# Patient Record
Sex: FEMALE | Race: WHITE | NOT HISPANIC OR LATINO | Employment: OTHER | URBAN - METROPOLITAN AREA
[De-identification: names, ages, dates, MRNs, and addresses within clinical notes are randomized per-mention and may not be internally consistent; named-entity substitution may affect disease eponyms.]

---

## 2019-09-06 ENCOUNTER — DOCUMENTATION ONLY (OUTPATIENT)
Dept: OTHER | Facility: CLINIC | Age: 74
End: 2019-09-06

## 2021-05-29 ENCOUNTER — RECORDS - HEALTHEAST (OUTPATIENT)
Dept: ADMINISTRATIVE | Facility: CLINIC | Age: 76
End: 2021-05-29

## 2024-04-18 DIAGNOSIS — C34.90 LUNG CANCER (H): Primary | ICD-10-CM

## 2024-04-29 ENCOUNTER — HOSPITAL ENCOUNTER (OUTPATIENT)
Dept: CT IMAGING | Facility: CLINIC | Age: 79
Discharge: HOME | End: 2024-04-29
Attending: RADIOLOGY | Admitting: RADIOLOGY
Payer: COMMERCIAL

## 2024-04-29 DIAGNOSIS — C34.90 LUNG CANCER (H): ICD-10-CM

## 2024-04-29 LAB
CREAT BLD-MCNC: 1.4 MG/DL (ref 0.5–1)
EGFRCR SERPLBLD CKD-EPI 2021: 39 ML/MIN/1.73M2

## 2024-04-29 PROCEDURE — 82565 ASSAY OF CREATININE: CPT

## 2024-04-29 PROCEDURE — 250N000009 HC RX 250: Performed by: RADIOLOGY

## 2024-04-29 PROCEDURE — 250N000011 HC RX IP 250 OP 636: Performed by: RADIOLOGY

## 2024-04-29 PROCEDURE — 71260 CT THORAX DX C+: CPT

## 2024-04-29 RX ORDER — IOPAMIDOL 755 MG/ML
500 INJECTION, SOLUTION INTRAVASCULAR ONCE
Status: COMPLETED | OUTPATIENT
Start: 2024-04-29 | End: 2024-04-29

## 2024-04-29 RX ADMIN — IOPAMIDOL 83 ML: 755 INJECTION, SOLUTION INTRAVENOUS at 09:58

## 2024-04-29 RX ADMIN — SODIUM CHLORIDE 68 ML: 9 INJECTION, SOLUTION INTRAVENOUS at 09:58

## 2024-05-25 ENCOUNTER — HEALTH MAINTENANCE LETTER (OUTPATIENT)
Age: 79
End: 2024-05-25

## 2024-07-09 ENCOUNTER — HOSPITAL ENCOUNTER (INPATIENT)
Facility: CLINIC | Age: 79
LOS: 8 days | Discharge: HOME HEALTH | DRG: 205 | End: 2024-07-17
Attending: EMERGENCY MEDICINE | Admitting: INTERNAL MEDICINE
Payer: COMMERCIAL

## 2024-07-09 ENCOUNTER — APPOINTMENT (OUTPATIENT)
Dept: CT IMAGING | Facility: CLINIC | Age: 79
DRG: 205 | End: 2024-07-09
Payer: COMMERCIAL

## 2024-07-09 DIAGNOSIS — J15.9 COMMUNITY ACQUIRED BACTERIAL PNEUMONIA: ICD-10-CM

## 2024-07-09 DIAGNOSIS — R09.02 HYPOXIA: ICD-10-CM

## 2024-07-09 DIAGNOSIS — I50.9 ACUTE CONGESTIVE HEART FAILURE, UNSPECIFIED HEART FAILURE TYPE (H): ICD-10-CM

## 2024-07-09 LAB
ALBUMIN SERPL BCG-MCNC: 2.8 G/DL (ref 3.5–5.2)
ALP SERPL-CCNC: 78 U/L (ref 40–150)
ALT SERPL W P-5'-P-CCNC: 52 U/L (ref 0–50)
ANION GAP SERPL CALCULATED.3IONS-SCNC: 13 MMOL/L (ref 7–15)
AST SERPL W P-5'-P-CCNC: 101 U/L (ref 0–45)
BASOPHILS # BLD AUTO: 0 10E3/UL (ref 0–0.2)
BASOPHILS NFR BLD AUTO: 0 %
BILIRUB SERPL-MCNC: 0.6 MG/DL
BUN SERPL-MCNC: 22.3 MG/DL (ref 8–23)
CALCIUM SERPL-MCNC: 8.7 MG/DL (ref 8.8–10.2)
CHLORIDE SERPL-SCNC: 90 MMOL/L (ref 98–107)
CREAT SERPL-MCNC: 1.37 MG/DL (ref 0.51–0.95)
CREAT SERPL-MCNC: 1.41 MG/DL (ref 0.51–0.95)
D DIMER PPP FEU-MCNC: 1.6 UG/ML FEU (ref 0–0.5)
DEPRECATED HCO3 PLAS-SCNC: 25 MMOL/L (ref 22–29)
EGFRCR SERPLBLD CKD-EPI 2021: 38 ML/MIN/1.73M2
EGFRCR SERPLBLD CKD-EPI 2021: 40 ML/MIN/1.73M2
EOSINOPHIL # BLD AUTO: 0 10E3/UL (ref 0–0.7)
EOSINOPHIL NFR BLD AUTO: 0 %
ERYTHROCYTE [DISTWIDTH] IN BLOOD BY AUTOMATED COUNT: 18 % (ref 10–15)
GLUCOSE SERPL-MCNC: 104 MG/DL (ref 70–99)
HCT VFR BLD AUTO: 34.7 % (ref 35–47)
HGB BLD-MCNC: 11.9 G/DL (ref 11.7–15.7)
HOLD SPECIMEN: NORMAL
IMM GRANULOCYTES # BLD: 0.2 10E3/UL
IMM GRANULOCYTES NFR BLD: 2 %
LYMPHOCYTES # BLD AUTO: 0.2 10E3/UL (ref 0.8–5.3)
LYMPHOCYTES NFR BLD AUTO: 2 %
MCH RBC QN AUTO: 30 PG (ref 26.5–33)
MCHC RBC AUTO-ENTMCNC: 34.3 G/DL (ref 31.5–36.5)
MCV RBC AUTO: 87 FL (ref 78–100)
MONOCYTES # BLD AUTO: 1.5 10E3/UL (ref 0–1.3)
MONOCYTES NFR BLD AUTO: 15 %
NEUTROPHILS # BLD AUTO: 7.8 10E3/UL (ref 1.6–8.3)
NEUTROPHILS NFR BLD AUTO: 80 %
NRBC # BLD AUTO: 0 10E3/UL
NRBC BLD AUTO-RTO: 0 /100
NT-PROBNP SERPL-MCNC: 2184 PG/ML (ref 0–1800)
PLATELET # BLD AUTO: 408 10E3/UL (ref 150–450)
POTASSIUM SERPL-SCNC: 3.2 MMOL/L (ref 3.4–5.3)
PROT SERPL-MCNC: 6.3 G/DL (ref 6.4–8.3)
RBC # BLD AUTO: 3.97 10E6/UL (ref 3.8–5.2)
SODIUM SERPL-SCNC: 128 MMOL/L (ref 135–145)
TROPONIN T SERPL HS-MCNC: 28 NG/L
TROPONIN T SERPL HS-MCNC: 28 NG/L
WBC # BLD AUTO: 9.8 10E3/UL (ref 4–11)

## 2024-07-09 PROCEDURE — 83735 ASSAY OF MAGNESIUM: CPT | Performed by: HOSPITALIST

## 2024-07-09 PROCEDURE — 71275 CT ANGIOGRAPHY CHEST: CPT

## 2024-07-09 PROCEDURE — 36415 COLL VENOUS BLD VENIPUNCTURE: CPT

## 2024-07-09 PROCEDURE — 85379 FIBRIN DEGRADATION QUANT: CPT

## 2024-07-09 PROCEDURE — 250N000009 HC RX 250: Performed by: EMERGENCY MEDICINE

## 2024-07-09 PROCEDURE — 120N000001 HC R&B MED SURG/OB

## 2024-07-09 PROCEDURE — 250N000011 HC RX IP 250 OP 636: Performed by: INTERNAL MEDICINE

## 2024-07-09 PROCEDURE — 80053 COMPREHEN METABOLIC PANEL: CPT

## 2024-07-09 PROCEDURE — 258N000003 HC RX IP 258 OP 636: Performed by: INTERNAL MEDICINE

## 2024-07-09 PROCEDURE — 36415 COLL VENOUS BLD VENIPUNCTURE: CPT | Performed by: EMERGENCY MEDICINE

## 2024-07-09 PROCEDURE — 84484 ASSAY OF TROPONIN QUANT: CPT

## 2024-07-09 PROCEDURE — 83880 ASSAY OF NATRIURETIC PEPTIDE: CPT

## 2024-07-09 PROCEDURE — 250N000013 HC RX MED GY IP 250 OP 250 PS 637: Performed by: INTERNAL MEDICINE

## 2024-07-09 PROCEDURE — 82565 ASSAY OF CREATININE: CPT | Performed by: INTERNAL MEDICINE

## 2024-07-09 PROCEDURE — 85004 AUTOMATED DIFF WBC COUNT: CPT

## 2024-07-09 PROCEDURE — 250N000011 HC RX IP 250 OP 636

## 2024-07-09 PROCEDURE — 86140 C-REACTIVE PROTEIN: CPT | Performed by: STUDENT IN AN ORGANIZED HEALTH CARE EDUCATION/TRAINING PROGRAM

## 2024-07-09 PROCEDURE — 99223 1ST HOSP IP/OBS HIGH 75: CPT | Performed by: INTERNAL MEDICINE

## 2024-07-09 PROCEDURE — 87186 SC STD MICRODIL/AGAR DIL: CPT

## 2024-07-09 PROCEDURE — 250N000011 HC RX IP 250 OP 636: Performed by: EMERGENCY MEDICINE

## 2024-07-09 PROCEDURE — 87581 M.PNEUMON DNA AMP PROBE: CPT | Performed by: INTERNAL MEDICINE

## 2024-07-09 PROCEDURE — 87149 DNA/RNA DIRECT PROBE: CPT

## 2024-07-09 RX ORDER — CEFTRIAXONE 1 G/1
1 INJECTION, POWDER, FOR SOLUTION INTRAMUSCULAR; INTRAVENOUS EVERY 24 HOURS
Status: DISCONTINUED | OUTPATIENT
Start: 2024-07-10 | End: 2024-07-11

## 2024-07-09 RX ORDER — IOPAMIDOL 755 MG/ML
500 INJECTION, SOLUTION INTRAVASCULAR ONCE
Status: COMPLETED | OUTPATIENT
Start: 2024-07-09 | End: 2024-07-09

## 2024-07-09 RX ORDER — FUROSEMIDE 10 MG/ML
40 INJECTION INTRAMUSCULAR; INTRAVENOUS ONCE
Status: COMPLETED | OUTPATIENT
Start: 2024-07-09 | End: 2024-07-09

## 2024-07-09 RX ORDER — NICOTINE POLACRILEX 4 MG/1
20 GUM, CHEWING ORAL DAILY
COMMUNITY

## 2024-07-09 RX ORDER — CARVEDILOL 12.5 MG/1
12.5 TABLET ORAL 2 TIMES DAILY WITH MEALS
Status: DISCONTINUED | OUTPATIENT
Start: 2024-07-09 | End: 2024-07-17 | Stop reason: HOSPADM

## 2024-07-09 RX ORDER — AMOXICILLIN 250 MG
2 CAPSULE ORAL 2 TIMES DAILY PRN
Status: DISCONTINUED | OUTPATIENT
Start: 2024-07-09 | End: 2024-07-17 | Stop reason: HOSPADM

## 2024-07-09 RX ORDER — LIDOCAINE 40 MG/G
CREAM TOPICAL
Status: DISCONTINUED | OUTPATIENT
Start: 2024-07-09 | End: 2024-07-17 | Stop reason: HOSPADM

## 2024-07-09 RX ORDER — AZITHROMYCIN 500 MG/5ML
500 INJECTION, POWDER, LYOPHILIZED, FOR SOLUTION INTRAVENOUS ONCE
Status: COMPLETED | OUTPATIENT
Start: 2024-07-09 | End: 2024-07-09

## 2024-07-09 RX ORDER — CALCIUM CARBONATE 500 MG/1
1000 TABLET, CHEWABLE ORAL 4 TIMES DAILY PRN
Status: DISCONTINUED | OUTPATIENT
Start: 2024-07-09 | End: 2024-07-17 | Stop reason: HOSPADM

## 2024-07-09 RX ORDER — ASPIRIN 81 MG/1
81 TABLET ORAL EVERY OTHER DAY
COMMUNITY

## 2024-07-09 RX ORDER — ROSUVASTATIN CALCIUM 40 MG/1
40 TABLET, COATED ORAL AT BEDTIME
COMMUNITY

## 2024-07-09 RX ORDER — KETOTIFEN FUMARATE 0.35 MG/ML
1 SOLUTION/ DROPS OPHTHALMIC 2 TIMES DAILY
Status: DISCONTINUED | OUTPATIENT
Start: 2024-07-09 | End: 2024-07-17 | Stop reason: HOSPADM

## 2024-07-09 RX ORDER — ROSUVASTATIN CALCIUM 20 MG/1
40 TABLET, COATED ORAL AT BEDTIME
Status: DISCONTINUED | OUTPATIENT
Start: 2024-07-09 | End: 2024-07-17 | Stop reason: HOSPADM

## 2024-07-09 RX ORDER — AZITHROMYCIN 250 MG/1
250 TABLET, FILM COATED ORAL DAILY
Status: DISCONTINUED | OUTPATIENT
Start: 2024-07-10 | End: 2024-07-13

## 2024-07-09 RX ORDER — ONDANSETRON 4 MG/1
4 TABLET, ORALLY DISINTEGRATING ORAL EVERY 6 HOURS PRN
Status: DISCONTINUED | OUTPATIENT
Start: 2024-07-09 | End: 2024-07-17 | Stop reason: HOSPADM

## 2024-07-09 RX ORDER — CARVEDILOL 12.5 MG/1
12.5 TABLET ORAL 2 TIMES DAILY WITH MEALS
COMMUNITY

## 2024-07-09 RX ORDER — LEVOTHYROXINE SODIUM 75 UG/1
75 TABLET ORAL
Status: DISCONTINUED | OUTPATIENT
Start: 2024-07-10 | End: 2024-07-17 | Stop reason: HOSPADM

## 2024-07-09 RX ORDER — AMOXICILLIN 250 MG
1 CAPSULE ORAL 2 TIMES DAILY PRN
Status: DISCONTINUED | OUTPATIENT
Start: 2024-07-09 | End: 2024-07-17 | Stop reason: HOSPADM

## 2024-07-09 RX ORDER — LEVOTHYROXINE SODIUM 75 UG/1
75 TABLET ORAL DAILY
COMMUNITY
Start: 2024-01-24

## 2024-07-09 RX ORDER — ASPIRIN 81 MG/1
81 TABLET, CHEWABLE ORAL EVERY EVENING
Status: DISCONTINUED | OUTPATIENT
Start: 2024-07-09 | End: 2024-07-17 | Stop reason: HOSPADM

## 2024-07-09 RX ORDER — ENOXAPARIN SODIUM 100 MG/ML
40 INJECTION SUBCUTANEOUS EVERY 24 HOURS
Status: DISCONTINUED | OUTPATIENT
Start: 2024-07-09 | End: 2024-07-17 | Stop reason: HOSPADM

## 2024-07-09 RX ORDER — ACETAMINOPHEN 325 MG/1
975 TABLET ORAL EVERY 8 HOURS PRN
Status: DISCONTINUED | OUTPATIENT
Start: 2024-07-09 | End: 2024-07-17 | Stop reason: HOSPADM

## 2024-07-09 RX ORDER — ONDANSETRON 2 MG/ML
4 INJECTION INTRAMUSCULAR; INTRAVENOUS EVERY 6 HOURS PRN
Status: DISCONTINUED | OUTPATIENT
Start: 2024-07-09 | End: 2024-07-17 | Stop reason: HOSPADM

## 2024-07-09 RX ORDER — CEFTRIAXONE 2 G/1
2 INJECTION, POWDER, FOR SOLUTION INTRAMUSCULAR; INTRAVENOUS ONCE
Status: COMPLETED | OUTPATIENT
Start: 2024-07-09 | End: 2024-07-09

## 2024-07-09 RX ORDER — PANTOPRAZOLE SODIUM 40 MG/1
40 TABLET, DELAYED RELEASE ORAL DAILY
Status: DISCONTINUED | OUTPATIENT
Start: 2024-07-10 | End: 2024-07-17 | Stop reason: HOSPADM

## 2024-07-09 RX ADMIN — KETOTIFEN FUMARATE 1 DROP: 0.25 SOLUTION/ DROPS OPHTHALMIC at 23:15

## 2024-07-09 RX ADMIN — SODIUM CHLORIDE 76 ML: 9 INJECTION, SOLUTION INTRAVENOUS at 19:01

## 2024-07-09 RX ADMIN — CEFTRIAXONE 2 G: 2 INJECTION, POWDER, FOR SOLUTION INTRAMUSCULAR; INTRAVENOUS at 20:53

## 2024-07-09 RX ADMIN — ENOXAPARIN SODIUM 40 MG: 40 INJECTION SUBCUTANEOUS at 22:52

## 2024-07-09 RX ADMIN — AZITHROMYCIN MONOHYDRATE 500 MG: 500 INJECTION, POWDER, LYOPHILIZED, FOR SOLUTION INTRAVENOUS at 22:05

## 2024-07-09 RX ADMIN — ROSUVASTATIN CALCIUM 40 MG: 20 TABLET, FILM COATED ORAL at 22:50

## 2024-07-09 RX ADMIN — FUROSEMIDE 40 MG: 10 INJECTION, SOLUTION INTRAMUSCULAR; INTRAVENOUS at 20:33

## 2024-07-09 RX ADMIN — ASPIRIN 81 MG CHEWABLE TABLET 81 MG: 81 TABLET CHEWABLE at 22:50

## 2024-07-09 RX ADMIN — IOPAMIDOL 55 ML: 755 INJECTION, SOLUTION INTRAVENOUS at 19:01

## 2024-07-09 ASSESSMENT — ACTIVITIES OF DAILY LIVING (ADL)
ADLS_ACUITY_SCORE: 35

## 2024-07-09 ASSESSMENT — COLUMBIA-SUICIDE SEVERITY RATING SCALE - C-SSRS
6. HAVE YOU EVER DONE ANYTHING, STARTED TO DO ANYTHING, OR PREPARED TO DO ANYTHING TO END YOUR LIFE?: NO
1. IN THE PAST MONTH, HAVE YOU WISHED YOU WERE DEAD OR WISHED YOU COULD GO TO SLEEP AND NOT WAKE UP?: NO
2. HAVE YOU ACTUALLY HAD ANY THOUGHTS OF KILLING YOURSELF IN THE PAST MONTH?: NO

## 2024-07-09 NOTE — ED TRIAGE NOTES
Pt complains of sob that became worse the last couple of days. Pt has lung cancer and does chemotherapy and radiation. Pt had last tx June 12th. No cp. Pt not normally on O2. Pt O2 88% RA in triage.

## 2024-07-09 NOTE — ED PROVIDER NOTES
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  Patient presents to the ED with complaint of shortness of breath on exertion and a nonproductive cough for 3 days.  Patient also notes increased swelling in her bilateral lower extremities for the last 3 days.  She denies unilateral erythema or edema in her lower extremities.  She also denies fever and chills, headache and dizziness, chest pain, abdominal pain, nausea and vomiting.  Patient states she had an echocardiogram 2 years ago, she is uncertain of results but was told she does not have heart failure.    Independent Historian   None    Review of External Notes   6/12/2024 oncology office visit note reviewed, Paclitaxel, Carboplatin C6D1 infusion   10/5/2022 echocardiogram LV ejection fraction of 60%  Past Medical History     Medical History and Problem List   No past medical history on file.    Medications   aspirin 81 MG EC tablet  carvedilol (COREG) 12.5 MG tablet  KETOTIFEN FUMARATE OP  levothyroxine (SYNTHROID/LEVOTHROID) 75 MCG tablet  omeprazole 20 MG tablet  rosuvastatin (CRESTOR) 40 MG tablet        Surgical History   Past Surgical History:   Procedure Laterality Date    IR CHEST PORT PLACEMENT > 5 YRS OF AGE  5/6/2024       Physical Exam     Patient Vitals for the past 24 hrs:   BP Temp Temp src Pulse Resp SpO2 Height Weight   07/09/24 2030 127/70 -- -- 82 -- 96 % -- --   07/09/24 2015 120/72 -- -- 79 -- 96 % -- --   07/09/24 2010 -- -- -- -- -- (!) 88 % -- --   07/09/24 2000 116/75 -- -- 84 -- (!) 86 % -- --   07/09/24 1950 127/74 -- -- -- -- (!) 74 % -- --   07/09/24 1930 126/77 -- --  "80 -- 96 % -- --   07/09/24 1915 125/70 -- -- 84 -- 96 % -- --   07/09/24 1735 -- 98.3  F (36.8  C) Oral -- -- -- -- --   07/09/24 1734 139/77 -- -- 85 17 (!) 88 % 1.626 m (5' 4\") 69.4 kg (153 lb)     Physical Exam  Physical Exam:  GENERAL: Warm, dry, alert, no increased work of breathing  HEENT: PERRL, no scleral icterus, clear conjunctiva, oropharynx clear  NECK: No JVD, supple without lymphadenopathy.  No stiffness or restricted range of motion  HEART: Regular rate and rhythm, no murmur or rubs  LUNGS: Decreased and equal bilaterally.  No crackles or wheezes are heard.  ABD: Soft, nontender, nondistended, no guarding, with good bowel sounds heard.  BACK: No CVAT, no obvious deformities  EXTREMITIES: Moves all extremities without difficulty, bilateral lower extremity +1 pitting edema, equal, no erythema.  SKIN: Warm and dry without rash or lesions.  NEUROLOGICAL: No focal deficits.    PSYCH: Appropriate mood and affect.     Diagnostics     Lab Results   Labs Ordered and Resulted from Time of ED Arrival to Time of ED Departure   D DIMER QUANTITATIVE - Abnormal       Result Value    D-Dimer Quantitative 1.60 (*)    COMPREHENSIVE METABOLIC PANEL - Abnormal    Sodium 128 (*)     Potassium 3.2 (*)     Carbon Dioxide (CO2) 25      Anion Gap 13      Urea Nitrogen 22.3      Creatinine 1.37 (*)     GFR Estimate 40 (*)     Calcium 8.7 (*)     Chloride 90 (*)     Glucose 104 (*)     Alkaline Phosphatase 78       (*)     ALT 52 (*)     Protein Total 6.3 (*)     Albumin 2.8 (*)     Bilirubin Total 0.6     TROPONIN T, HIGH SENSITIVITY - Abnormal    Troponin T, High Sensitivity 28 (*)    NT PROBNP INPATIENT - Abnormal    N terminal Pro BNP Inpatient 2,184 (*)    CBC WITH PLATELETS AND DIFFERENTIAL - Abnormal    WBC Count 9.8      RBC Count 3.97      Hemoglobin 11.9      Hematocrit 34.7 (*)     MCV 87      MCH 30.0      MCHC 34.3      RDW 18.0 (*)     Platelet Count 408      % Neutrophils 80      % Lymphocytes 2      % " Monocytes 15      % Eosinophils 0      % Basophils 0      % Immature Granulocytes 2      NRBCs per 100 WBC 0      Absolute Neutrophils 7.8      Absolute Lymphocytes 0.2 (*)     Absolute Monocytes 1.5 (*)     Absolute Eosinophils 0.0      Absolute Basophils 0.0      Absolute Immature Granulocytes 0.2      Absolute NRBCs 0.0     TROPONIN T, HIGH SENSITIVITY - Abnormal    Troponin T, High Sensitivity 28 (*)    BLOOD CULTURE   BLOOD CULTURE   RESPIRATORY PANEL PCR       Imaging   CT Chest Pulmonary Embolism w Contrast   Final Result   IMPRESSION:   1.  No pulmonary emboli.   2.  New, diffuse groundglass changes throughout both lungs. New patchy airspace changes throughout the left lung with areas of consolidation. This is likely secondary to infectious or inflammatory pneumonitis.   3.  No significant change in the left lower lobe peripheral nodular density representing the known primary neoplasm. Indwelling fiducial marker.   4.  Stable left hilar adenopathy.   5.  Partially visualized abdominal aortic aneurysm.          EKG   ECG results from 07/09/24   EKG 12-lead, tracing only     Value    Systolic Blood Pressure     Diastolic Blood Pressure     Ventricular Rate 81    Atrial Rate 81    WV Interval 180    QRS Duration 112        QTc 448    P Axis 82    R AXIS 61    T Axis 21    Interpretation ECG      Sinus rhythm with occasional Premature ventricular complexes  Low voltage QRS  Cannot rule out Anterior infarct (cited on or before 10-SEP-2005)  Abnormal ECG  When compared with ECG of 12-SEP-2005 13:12,  Sinus rhythm has replaced Ectopic atrial rhythm  QRS duration has increased  Minimal criteria for Inferior infarct are no longer Present  Questionable change in initial forces of Anterior leads  T wave inversion less evident in Inferior leads         Independent Interpretation   None    ED Course      Medications Administered   Medications   cefTRIAXone (ROCEPHIN) 2 g vial to attach to  ml bag for ADULTS  or NS 50 ml bag for PEDS (2 g Intravenous $New Bag 7/9/24 2053)   azithromycin (ZITHROMAX) 500 mg in  mL intermittent infusion (has no administration in time range)   iopamidol (ISOVUE-370) solution 500 mL (55 mLs Intravenous $Given 7/9/24 1901)   CT Scan Flush (76 mLs Intravenous $Given 7/9/24 1901)   furosemide (LASIX) injection 40 mg (40 mg Intravenous $Given 7/9/24 2033)       Procedures   Procedures     Discussion of Management   Admitting Hospitalist, Dr. Alvarez  Staffed with Dr. Knight    ED Course   ED Course as of 07/09/24 2117 Tue Jul 09, 2024 1800 I evaluated and examined the patient   1936 I consulted with clinical pharmacist regarding side effects of carboplatin and paclitaxel, paclitaxel is known to be cardiotoxic.   2025 I spoke with hospital medicine, Dr. Alvarez, patient accepted for admission for pneumonia, heart failure, and new oxygen dependence.       Optional/Additional Documentation  None    Medical Decision Making / Diagnosis     CMS Diagnoses: None    MIPS      CT for PE was ordered because the patient had an abnormal d-dimer.    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  Differential includes but is not limited to pulmonary embolism, congestive heart failure, pneumonia, ACS, and pneumothorax among many others.  Vital signs significant for oxygen saturation 88% on room air at presentation otherwise she was afebrile, not tachycardic and normotensive.  On physical exam, she had equal bilateral lower extremity edema, no erythema.    Due to concern for pulmonary embolism a D-dimer was obtained, 1.6.  CT for PE was obtained which was negative for pulmonary embolism however there was new, diffuse groundglass changes throughout both lungs and new patchy airspace changes throughout the left lung with areas of consolidation.  Findings are  concerning for pneumonia or inflammatory response.  Other laboratory workup was significant for BNP of 2184 with no previous to compare however no history of heart failure.  Patient also had elevated troponin at 28, delta obtained which was flat, this is most likely demand ischemia, ECG was not consistent with ACS pattern, I have low suspicion for ACS.  Other findings include increased creatinine and decreased GFR however these are consistent with previous findings and consistent with history of CKD.  Patient's clinical presentation and findings are consistent with new heart failure, most likely caused by paclitaxel, and pneumonia.  Blood cultures were ordered, patient given ceftriaxone and azithromycin for pneumonia.  Also gave 40 mg of Lasix for heart failure.  I discussed all these findings with the patient and recommended admission given here new oxygen dependence.  Patient states she understands and agrees with this plan.  I spoke with hospital medicine who agrees to admit patient for hypoxia, pneumonia, and heart failure.  She was admitted in stable condition with no further change in clinical condition.  All nurses notes and vital signs reviewed.      Disposition   The patient was admitted to the hospital.     Diagnosis     ICD-10-CM    1. Acute congestive heart failure, unspecified heart failure type (H)  I50.9       2. Community acquired bacterial pneumonia  J15.9       3. Hypoxia  R09.02            Discharge Medications   New Prescriptions    No medications on file         VICENTE Canada John, PA-C  07/09/24 7450

## 2024-07-10 ENCOUNTER — APPOINTMENT (OUTPATIENT)
Dept: OCCUPATIONAL THERAPY | Facility: CLINIC | Age: 79
DRG: 205 | End: 2024-07-10
Attending: INTERNAL MEDICINE
Payer: COMMERCIAL

## 2024-07-10 ENCOUNTER — APPOINTMENT (OUTPATIENT)
Dept: CARDIOLOGY | Facility: CLINIC | Age: 79
DRG: 205 | End: 2024-07-10
Attending: INTERNAL MEDICINE
Payer: COMMERCIAL

## 2024-07-10 LAB
ALBUMIN SERPL BCG-MCNC: 2.6 G/DL (ref 3.5–5.2)
ALP SERPL-CCNC: 73 U/L (ref 40–150)
ALT SERPL W P-5'-P-CCNC: 45 U/L (ref 0–50)
ANION GAP SERPL CALCULATED.3IONS-SCNC: 14 MMOL/L (ref 7–15)
AST SERPL W P-5'-P-CCNC: 83 U/L (ref 0–45)
ATRIAL RATE - MUSE: 81 BPM
BILIRUB SERPL-MCNC: 0.3 MG/DL
BUN SERPL-MCNC: 19.2 MG/DL (ref 8–23)
C PNEUM DNA SPEC QL NAA+PROBE: NOT DETECTED
CALCIUM SERPL-MCNC: 8.2 MG/DL (ref 8.8–10.2)
CHLORIDE SERPL-SCNC: 92 MMOL/L (ref 98–107)
CREAT SERPL-MCNC: 1.39 MG/DL (ref 0.51–0.95)
CRP SERPL-MCNC: 131.52 MG/L
DEPRECATED HCO3 PLAS-SCNC: 25 MMOL/L (ref 22–29)
DIASTOLIC BLOOD PRESSURE - MUSE: NORMAL MMHG
EGFRCR SERPLBLD CKD-EPI 2021: 39 ML/MIN/1.73M2
ENTEROCOCCUS FAECALIS: NOT DETECTED
ENTEROCOCCUS FAECIUM: NOT DETECTED
ERYTHROCYTE [DISTWIDTH] IN BLOOD BY AUTOMATED COUNT: 18.3 % (ref 10–15)
FLUAV H1 2009 PAND RNA SPEC QL NAA+PROBE: NOT DETECTED
FLUAV H1 RNA SPEC QL NAA+PROBE: NOT DETECTED
FLUAV H3 RNA SPEC QL NAA+PROBE: NOT DETECTED
FLUAV RNA SPEC QL NAA+PROBE: NOT DETECTED
FLUBV RNA SPEC QL NAA+PROBE: NOT DETECTED
GLUCOSE SERPL-MCNC: 99 MG/DL (ref 70–99)
HADV DNA SPEC QL NAA+PROBE: NOT DETECTED
HCOV PNL SPEC NAA+PROBE: NOT DETECTED
HCT VFR BLD AUTO: 35.1 % (ref 35–47)
HGB BLD-MCNC: 11.8 G/DL (ref 11.7–15.7)
HMPV RNA SPEC QL NAA+PROBE: NOT DETECTED
HPIV1 RNA SPEC QL NAA+PROBE: NOT DETECTED
HPIV2 RNA SPEC QL NAA+PROBE: NOT DETECTED
HPIV3 RNA SPEC QL NAA+PROBE: NOT DETECTED
HPIV4 RNA SPEC QL NAA+PROBE: NOT DETECTED
INTERPRETATION ECG - MUSE: NORMAL
LISTERIA SPECIES (DETECTED/NOT DETECTED): NOT DETECTED
LVEF ECHO: NORMAL
M PNEUMO DNA SPEC QL NAA+PROBE: NOT DETECTED
MAGNESIUM SERPL-MCNC: 2 MG/DL (ref 1.7–2.3)
MCH RBC QN AUTO: 29.2 PG (ref 26.5–33)
MCHC RBC AUTO-ENTMCNC: 33.6 G/DL (ref 31.5–36.5)
MCV RBC AUTO: 87 FL (ref 78–100)
P AXIS - MUSE: 82 DEGREES
PLATELET # BLD AUTO: 387 10E3/UL (ref 150–450)
POTASSIUM SERPL-SCNC: 2.9 MMOL/L (ref 3.4–5.3)
POTASSIUM SERPL-SCNC: 3.9 MMOL/L (ref 3.4–5.3)
PR INTERVAL - MUSE: 180 MS
PROCALCITONIN SERPL IA-MCNC: 0.31 NG/ML
PROT SERPL-MCNC: 6.2 G/DL (ref 6.4–8.3)
QRS DURATION - MUSE: 112 MS
QT - MUSE: 386 MS
QTC - MUSE: 448 MS
R AXIS - MUSE: 61 DEGREES
RBC # BLD AUTO: 4.04 10E6/UL (ref 3.8–5.2)
RSV RNA SPEC QL NAA+PROBE: NOT DETECTED
RSV RNA SPEC QL NAA+PROBE: NOT DETECTED
RV+EV RNA SPEC QL NAA+PROBE: NOT DETECTED
SODIUM SERPL-SCNC: 131 MMOL/L (ref 135–145)
STAPHYLOCOCCUS AUREUS: NOT DETECTED
STAPHYLOCOCCUS EPIDERMIDIS: NOT DETECTED
STAPHYLOCOCCUS LUGDUNENSIS: NOT DETECTED
STAPHYLOCOCCUS SPECIES: DETECTED
STREPTOCOCCUS AGALACTIAE: NOT DETECTED
STREPTOCOCCUS ANGINOSUS GROUP: NOT DETECTED
STREPTOCOCCUS PNEUMONIAE: NOT DETECTED
STREPTOCOCCUS PYOGENES: NOT DETECTED
STREPTOCOCCUS SPECIES: NOT DETECTED
SYSTOLIC BLOOD PRESSURE - MUSE: NORMAL MMHG
T AXIS - MUSE: 21 DEGREES
VENTRICULAR RATE- MUSE: 81 BPM
WBC # BLD AUTO: 9.5 10E3/UL (ref 4–11)

## 2024-07-10 PROCEDURE — 36415 COLL VENOUS BLD VENIPUNCTURE: CPT | Performed by: STUDENT IN AN ORGANIZED HEALTH CARE EDUCATION/TRAINING PROGRAM

## 2024-07-10 PROCEDURE — 84145 PROCALCITONIN (PCT): CPT | Performed by: STUDENT IN AN ORGANIZED HEALTH CARE EDUCATION/TRAINING PROGRAM

## 2024-07-10 PROCEDURE — 99207 PR NO CHARGE LOS: CPT | Performed by: STUDENT IN AN ORGANIZED HEALTH CARE EDUCATION/TRAINING PROGRAM

## 2024-07-10 PROCEDURE — 87449 NOS EACH ORGANISM AG IA: CPT | Performed by: STUDENT IN AN ORGANIZED HEALTH CARE EDUCATION/TRAINING PROGRAM

## 2024-07-10 PROCEDURE — 97165 OT EVAL LOW COMPLEX 30 MIN: CPT | Mod: GO | Performed by: OCCUPATIONAL THERAPIST

## 2024-07-10 PROCEDURE — 87385 HISTOPLASMA CAPSUL AG IA: CPT | Performed by: STUDENT IN AN ORGANIZED HEALTH CARE EDUCATION/TRAINING PROGRAM

## 2024-07-10 PROCEDURE — 97530 THERAPEUTIC ACTIVITIES: CPT | Mod: GO | Performed by: OCCUPATIONAL THERAPIST

## 2024-07-10 PROCEDURE — 120N000001 HC R&B MED SURG/OB

## 2024-07-10 PROCEDURE — 255N000002 HC RX 255 OP 636: Performed by: HOSPITALIST

## 2024-07-10 PROCEDURE — C8929 TTE W OR WO FOL WCON,DOPPLER: HCPCS

## 2024-07-10 PROCEDURE — 85027 COMPLETE CBC AUTOMATED: CPT | Performed by: INTERNAL MEDICINE

## 2024-07-10 PROCEDURE — 999N000104 HC STATISTIC NO CHARGE: Performed by: PHYSICAL THERAPIST

## 2024-07-10 PROCEDURE — 99233 SBSQ HOSP IP/OBS HIGH 50: CPT | Performed by: HOSPITALIST

## 2024-07-10 PROCEDURE — 250N000013 HC RX MED GY IP 250 OP 250 PS 637: Performed by: HOSPITALIST

## 2024-07-10 PROCEDURE — 250N000011 HC RX IP 250 OP 636: Performed by: INTERNAL MEDICINE

## 2024-07-10 PROCEDURE — 93306 TTE W/DOPPLER COMPLETE: CPT | Mod: 26 | Performed by: INTERNAL MEDICINE

## 2024-07-10 PROCEDURE — 250N000012 HC RX MED GY IP 250 OP 636 PS 637: Performed by: HOSPITALIST

## 2024-07-10 PROCEDURE — 36415 COLL VENOUS BLD VENIPUNCTURE: CPT | Performed by: INTERNAL MEDICINE

## 2024-07-10 PROCEDURE — 999N000208 ECHOCARDIOGRAM COMPLETE

## 2024-07-10 PROCEDURE — 84132 ASSAY OF SERUM POTASSIUM: CPT | Performed by: HOSPITALIST

## 2024-07-10 PROCEDURE — 87040 BLOOD CULTURE FOR BACTERIA: CPT | Performed by: STUDENT IN AN ORGANIZED HEALTH CARE EDUCATION/TRAINING PROGRAM

## 2024-07-10 PROCEDURE — 99223 1ST HOSP IP/OBS HIGH 75: CPT | Performed by: STUDENT IN AN ORGANIZED HEALTH CARE EDUCATION/TRAINING PROGRAM

## 2024-07-10 PROCEDURE — 250N000013 HC RX MED GY IP 250 OP 250 PS 637: Performed by: INTERNAL MEDICINE

## 2024-07-10 PROCEDURE — 87305 ASPERGILLUS AG IA: CPT | Performed by: STUDENT IN AN ORGANIZED HEALTH CARE EDUCATION/TRAINING PROGRAM

## 2024-07-10 PROCEDURE — 36415 COLL VENOUS BLD VENIPUNCTURE: CPT | Performed by: HOSPITALIST

## 2024-07-10 PROCEDURE — 82040 ASSAY OF SERUM ALBUMIN: CPT | Performed by: INTERNAL MEDICINE

## 2024-07-10 RX ORDER — POTASSIUM CHLORIDE 20MEQ/15ML
20 LIQUID (ML) ORAL ONCE
Status: COMPLETED | OUTPATIENT
Start: 2024-07-10 | End: 2024-07-10

## 2024-07-10 RX ORDER — POTASSIUM CHLORIDE 1500 MG/1
40 TABLET, EXTENDED RELEASE ORAL ONCE
Status: COMPLETED | OUTPATIENT
Start: 2024-07-10 | End: 2024-07-10

## 2024-07-10 RX ORDER — POTASSIUM CHLORIDE 20MEQ/15ML
40 LIQUID (ML) ORAL ONCE
Status: COMPLETED | OUTPATIENT
Start: 2024-07-10 | End: 2024-07-10

## 2024-07-10 RX ORDER — POTASSIUM CHLORIDE 1500 MG/1
20 TABLET, EXTENDED RELEASE ORAL ONCE
Status: COMPLETED | OUTPATIENT
Start: 2024-07-10 | End: 2024-07-10

## 2024-07-10 RX ADMIN — HUMAN ALBUMIN MICROSPHERES AND PERFLUTREN 6 ML: 10; .22 INJECTION, SOLUTION INTRAVENOUS at 09:26

## 2024-07-10 RX ADMIN — KETOTIFEN FUMARATE 1 DROP: 0.25 SOLUTION/ DROPS OPHTHALMIC at 21:01

## 2024-07-10 RX ADMIN — PANTOPRAZOLE SODIUM 40 MG: 40 TABLET, DELAYED RELEASE ORAL at 07:46

## 2024-07-10 RX ADMIN — KETOTIFEN FUMARATE 1 DROP: 0.25 SOLUTION/ DROPS OPHTHALMIC at 07:49

## 2024-07-10 RX ADMIN — AZITHROMYCIN DIHYDRATE 250 MG: 250 TABLET ORAL at 07:46

## 2024-07-10 RX ADMIN — PREDNISONE 50 MG: 20 TABLET ORAL at 12:21

## 2024-07-10 RX ADMIN — LEVOTHYROXINE SODIUM 75 MCG: 0.07 TABLET ORAL at 07:46

## 2024-07-10 RX ADMIN — CEFTRIAXONE 1 G: 1 INJECTION, POWDER, FOR SOLUTION INTRAMUSCULAR; INTRAVENOUS at 19:43

## 2024-07-10 RX ADMIN — POTASSIUM CHLORIDE 20 MEQ: 1500 TABLET, EXTENDED RELEASE ORAL at 15:51

## 2024-07-10 RX ADMIN — POTASSIUM CHLORIDE 40 MEQ: 1500 TABLET, EXTENDED RELEASE ORAL at 03:30

## 2024-07-10 RX ADMIN — POTASSIUM CHLORIDE 40 MEQ: 1500 TABLET, EXTENDED RELEASE ORAL at 13:45

## 2024-07-10 RX ADMIN — ROSUVASTATIN CALCIUM 40 MG: 20 TABLET, FILM COATED ORAL at 21:08

## 2024-07-10 RX ADMIN — ASPIRIN 81 MG CHEWABLE TABLET 81 MG: 81 TABLET CHEWABLE at 19:42

## 2024-07-10 ASSESSMENT — ACTIVITIES OF DAILY LIVING (ADL)
ADLS_ACUITY_SCORE: 23
ADLS_ACUITY_SCORE: 38
ADLS_ACUITY_SCORE: 35
ADLS_ACUITY_SCORE: 23
ADLS_ACUITY_SCORE: 38
ADLS_ACUITY_SCORE: 23
ADLS_ACUITY_SCORE: 23
ADLS_ACUITY_SCORE: 38
ADLS_ACUITY_SCORE: 35
ADLS_ACUITY_SCORE: 35
ADLS_ACUITY_SCORE: 23
ADLS_ACUITY_SCORE: 35
ADLS_ACUITY_SCORE: 38
ADLS_ACUITY_SCORE: 23
ADLS_ACUITY_SCORE: 38
PREVIOUS_RESPONSIBILITIES: MEAL PREP;HOUSEKEEPING;LAUNDRY;SHOPPING;YARDWORK;MEDICATION MANAGEMENT;FINANCES;DRIVING
ADLS_ACUITY_SCORE: 23
ADLS_ACUITY_SCORE: 38
ADLS_ACUITY_SCORE: 23
ADLS_ACUITY_SCORE: 23

## 2024-07-10 NOTE — PLAN OF CARE
"Florencio Dumas RN : Progress Note  Dx: Hypoxia  Shift : 3131-7348  Category of Care: Inpatient  Days Since Admission: Today  VS: /76   Pulse 85   Temp 98.3  F (36.8  C) (Oral)   Resp 18   Ht 1.626 m (5' 4\")   Wt 69.4 kg (153 lb)   SpO2 97%   BMI 26.26 kg/m      Shift Summary; Occurences, Discussions & Interventions of Note --  Pt on 4L O2, tolerating well - can dip to mid 80s o2 sat with exertion. High 90s while at rest.   Denies pain . Aox4.   Pt blood culture was positive this afternoon - G+ Cocci in clusters. Pending verigine culture.  Tolerating regular diet. K and Mag protocols.   Was hypotensive this am, now VSS.  Pt was assessed by OT and was cleared to be up independent. Pt oxygen tubing can extend to bathroom. Purewick out.     Plan --  Pending verigine culture. Continuing O2.     ??? Care Plan notation attached at the bottom of this note. Space added to increase note readability for relevant care team members.                                                                                                                            ---      Goal Outcome Evaluation:      Plan of Care Reviewed With: patient    Overall Patient Progress: no changeOverall Patient Progress: no change    Outcome Evaluation: Pt on 4L O2, tolerating well - can dip to mid 80s o2 sat with exertion. High 90s while at rest. Denies pain . Aox4. Pt blood culture was however positive this afternoon - G+ Cocci in clusters. Tolerating regular diet. K and Mag protocols.      Problem: Adult Inpatient Plan of Care  Goal: Plan of Care Review  Description: The Plan of Care Review/Shift note should be completed every shift.  The Outcome Evaluation is a brief statement about your assessment that the patient is improving, declining, or no change.  This information will be displayed automatically on your shift  note.  Outcome: Not Progressing  Flowsheets (Taken 7/10/2024 1242)  Outcome Evaluation: Pt on 4L O2, tolerating well - can dip " "to mid 80s o2 sat with exertion. High 90s while at rest. Denies pain . Aox4. Pt blood culture was however positive this afternoon - G+ Cocci in clusters. Tolerating regular diet. K and Mag protocols.  Plan of Care Reviewed With: patient  Overall Patient Progress: no change  Goal: Patient-Specific Goal (Individualized)  Description: You can add care plan individualizations to a care plan. Examples of Individualization might be:  \"Parent requests to be called daily at 9am for status\", \"I have a hard time hearing out of my right ear\", or \"Do not touch me to wake me up as it startles  me\".  Outcome: Not Progressing  Goal: Readiness for Transition of Care  Outcome: Not Progressing  Intervention: Mutually Develop Transition Plan  Recent Flowsheet Documentation  Taken 7/10/2024 1200 by Florencio Dumas, RN  Equipment Currently Used at Home: cane, straight     Problem: Gas Exchange Impaired  Goal: Optimal Gas Exchange  Outcome: Not Progressing     Problem: Adult Inpatient Plan of Care  Goal: Absence of Hospital-Acquired Illness or Injury  Outcome: Progressing  Intervention: Identify and Manage Fall Risk  Recent Flowsheet Documentation  Taken 7/10/2024 1155 by Florencio Dumas, RN  Safety Promotion/Fall Prevention:   supervised activity   safety round/check completed  Goal: Optimal Comfort and Wellbeing  Outcome: Progressing     "

## 2024-07-10 NOTE — PHARMACY-ADMISSION MEDICATION HISTORY
Pharmacy Intern Admission Medication History    Admission medication history is complete. The information provided in this note is only as accurate as the sources available at the time of the update.    Information Source(s): Patient and CareEverywhere/SureScripts via in-person    Pertinent Information: No recent fill Hx for levothyroxine 75 mcg but pt confirmed still taking it    Changes made to PTA medication list:  Added: The whole list  Deleted: None  Changed: None    Allergies reviewed with patient and updates made in EHR: yes    Medication History Completed By: Alfredo Rae 7/9/2024 7:43 PM    PTA Med List   Medication Sig Last Dose    aspirin 81 MG EC tablet Take 81 mg by mouth every other day 7/8/2024 at pm    carvedilol (COREG) 12.5 MG tablet Take 12.5 mg by mouth 2 times daily (with meals) 7/9/2024 at am    KETOTIFEN FUMARATE OP Place 1 drop into both eyes 2 times daily 7/9/2024 at am    levothyroxine (SYNTHROID/LEVOTHROID) 75 MCG tablet Take 75 mcg by mouth daily 7/9/2024 at am    omeprazole 20 MG tablet Take 20 mg by mouth daily 7/9/2024 at am    rosuvastatin (CRESTOR) 40 MG tablet Take 40 mg by mouth at bedtime 7/8/2024 at pm

## 2024-07-10 NOTE — PROGRESS NOTES
PT: Orders received. Chart reviewed and discussed with care team.  PT not indicated due to OT assessed pt's mobility and pt is mobilizing safely and has no balance deficits.  No acute PT needs identified.  Defer discharge recommendations to OT/medical team.  Will complete orders.

## 2024-07-10 NOTE — ED NOTES
[facility]    ED Boarding Nurse Handoff Addendum Report:    Date/time: 7/10/2024, 6:30 AM     Activity Level: standby    Fall Risk: Yes:  nonskid shoes/slippers when out of bed, patient and family education, and activity supervised    Active Infusions: none    Current Meds Due: see MAR    Current care needs: oxygen therapy, pulse oximetry, electrolyte protocols, antibiotics    Oxygen requirements (liters/min and/or FiO2): 3 LPM    Respiratory status: Nasal cannula    Vital signs (within last 30 minutes):    Vitals:    07/09/24 2245 07/10/24 0100 07/10/24 0200 07/10/24 0300   BP: 96/61 93/60 94/61 92/66   Pulse: 80 76 80 78   Resp: 16      Temp: 98.1  F (36.7  C) 97.5  F (36.4  C)     TempSrc: Oral Oral     SpO2: 92% (!) 89% 93% (!) 85%   Weight:       Height:           Focused assessment within last 30 minutes:    A/Ox4. VSS on 3 L O2 via NC, soft BP's. Denies pain and nausea. C/o SOB and CROWLEY. LS diminished in all fields. SBA. Able to make needs known. Call light within reach.     ED Boarding Nurse name: Rosi Sethi RN

## 2024-07-10 NOTE — ED NOTES
[facility]  ED Nurse Handoff Report    ED Chief complaint: Shortness of Breath  . ED Diagnosis:   Final diagnoses:   Acute congestive heart failure, unspecified heart failure type (H)   Community acquired bacterial pneumonia   Hypoxia       Allergies: No Known Allergies    Code Status: Full Code    Activity level - Baseline/Home:  independent.  Activity Level - Current:   standby.   Lift room needed: No.   Bariatric: No   Needed: No   Isolation: No.   Infection: Not Applicable.     Respiratory status: Nasal cannula - 2L     Vital Signs (within 30 minutes):   Vitals:    07/09/24 1930 07/09/24 1950 07/09/24 2000 07/09/24 2010   BP: 126/77 127/74 116/75    Pulse: 80  84    Resp:       Temp:       TempSrc:       SpO2: 96% (!) 74% (!) 86% (!) 88%   Weight:       Height:           Cardiac Rhythm:  ,      Pain level:    Patient confused: No.   Patient Falls Risk: activity supervised, mobility aid in reach, and room door open.   Elimination Status: Has voided     Patient Report - Initial Complaint: Sob .   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  Patient presents to the ED with complaint of shortness of breath on exertion and a nonproductive cough for 3 days.  Patient also notes increased swelling in her bilateral lower extremities for the last 3 days.  She denies unilateral erythema or edema in her lower extremities.  She also denies fever and chills, headache and dizziness, chest pain, abdominal pain, nausea and vomiting.  Patient states she had an echocardiogram 2 years ago, she is uncertain of results but was told she does not have heart failure.      Abnormal Results:   Labs Ordered and Resulted from  Time of ED Arrival to Time of ED Departure   D DIMER QUANTITATIVE - Abnormal       Result Value    D-Dimer Quantitative 1.60 (*)    COMPREHENSIVE METABOLIC PANEL - Abnormal    Sodium 128 (*)     Potassium 3.2 (*)     Carbon Dioxide (CO2) 25      Anion Gap 13      Urea Nitrogen 22.3      Creatinine 1.37 (*)     GFR Estimate 40 (*)     Calcium 8.7 (*)     Chloride 90 (*)     Glucose 104 (*)     Alkaline Phosphatase 78       (*)     ALT 52 (*)     Protein Total 6.3 (*)     Albumin 2.8 (*)     Bilirubin Total 0.6     TROPONIN T, HIGH SENSITIVITY - Abnormal    Troponin T, High Sensitivity 28 (*)    NT PROBNP INPATIENT - Abnormal    N terminal Pro BNP Inpatient 2,184 (*)    CBC WITH PLATELETS AND DIFFERENTIAL - Abnormal    WBC Count 9.8      RBC Count 3.97      Hemoglobin 11.9      Hematocrit 34.7 (*)     MCV 87      MCH 30.0      MCHC 34.3      RDW 18.0 (*)     Platelet Count 408      % Neutrophils 80      % Lymphocytes 2      % Monocytes 15      % Eosinophils 0      % Basophils 0      % Immature Granulocytes 2      NRBCs per 100 WBC 0      Absolute Neutrophils 7.8      Absolute Lymphocytes 0.2 (*)     Absolute Monocytes 1.5 (*)     Absolute Eosinophils 0.0      Absolute Basophils 0.0      Absolute Immature Granulocytes 0.2      Absolute NRBCs 0.0     TROPONIN T, HIGH SENSITIVITY - Abnormal    Troponin T, High Sensitivity 28 (*)    BLOOD CULTURE   BLOOD CULTURE        CT Chest Pulmonary Embolism w Contrast   Final Result   IMPRESSION:   1.  No pulmonary emboli.   2.  New, diffuse groundglass changes throughout both lungs. New patchy airspace changes throughout the left lung with areas of consolidation. This is likely secondary to infectious or inflammatory pneumonitis.   3.  No significant change in the left lower lobe peripheral nodular density representing the known primary neoplasm. Indwelling fiducial marker.   4.  Stable left hilar adenopathy.   5.  Partially visualized abdominal aortic aneurysm.           Treatments provided: See MAR   Family Comments: Son at the bedside   OBS brochure/video discussed/provided to patient:  N/A  ED Medications:   Medications   furosemide (LASIX) injection 40 mg (has no administration in time range)   cefTRIAXone (ROCEPHIN) 2 g vial to attach to  ml bag for ADULTS or NS 50 ml bag for PEDS (has no administration in time range)   azithromycin (ZITHROMAX) 500 mg in  mL intermittent infusion (has no administration in time range)   iopamidol (ISOVUE-370) solution 500 mL (55 mLs Intravenous $Given 7/9/24 1901)   CT Scan Flush (76 mLs Intravenous $Given 7/9/24 1901)       Drips infusing:  No  For the majority of the shift this patient was Green.   Interventions performed were NA .    Sepsis treatment initiated: No    Cares/treatment/interventions/medications to be completed following ED care: Na    ED Nurse Name: Portia Dumont RN  8:28 PM

## 2024-07-10 NOTE — PROGRESS NOTES
07/10/24 1300   Appointment Info   Signing Clinician's Name / Credentials (OT) Niyah JG BatistaRL   Living Environment   People in Home alone   Current Living Arrangements house   Home Accessibility stairs to enter home;stairs within home   Number of Stairs, Main Entrance 3   Stair Railings, Main Entrance railings safe and in good condition   Number of Stairs, Within Home, Primary greater than 10 stairs   Stair Railings, Within Home, Primary railings safe and in good condition   Living Environment Comments Pt lives in a rambler with a basement (laundry in basement), all other needs met on main level. Pt has a tub shower and a shower chair which she owns but hasn't had to use yet. Pt has comfort height toilets   Self-Care   Usual Activity Tolerance excellent   Current Activity Tolerance moderate   Equipment Currently Used at Home cane, straight   Fall history within last six months no   Activity/Exercise/Self-Care Comment Pt recently bought a cane due to LE weakness when O2 drops, independent with ADLs/IADLs at baseline   Instrumental Activities of Daily Living (IADL)   Previous Responsibilities meal prep;housekeeping;laundry;shopping;yardwork;medication management;finances;driving   IADL Comments Reports independence with IADLs prior to admission, has very supportive family who are able to assist as needed with IADLS at discharge   General Information   Onset of Illness/Injury or Date of Surgery 07/09/24   Referring Physician Guzman Alvarez MD   Patient/Family Therapy Goal Statement (OT) Pt would like to improve SOB   Additional Occupational Profile Info/Pertinent History of Current Problem 78-year-old woman who began chemoradiation as well as stereotactic brain irradiation for 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     #Acute hypoxemic respiratory failure  secondary to suspected radiation pneumonitis versus community-acquired pneumonia: Medical history notable for left lower lobe adenocarcinoma with bulky mediastinal lymphadenopathy and 2 brain metastases that were ultimately diagnosed in April 2024.  Ms. Hudson smoked for about 40 pack years and quit in about 2005 when she had a myocardial infarction.  Her RCA was stented at that time and other less obstructive disease was noted.  She also has CKD stage III, essential hypertension and dyslipidemia.  She notes that she received chemoradiation that ended in early June.  Since completion, she has had progressive shortness of breath particularly with exertion.  She lives alone in a single-family home.  She has had difficulty with day-to-day activity.  He denies chest pain.  No clear fevers.  She has been coughing without much production.  -In the ER, patient afebrile and normotensive.  Saturations in the high 80s on room air. CBC without leukocytosis.  BMP with creatinine close to baseline at 1.37.  Sodium mildly low 128, potassium 3.2.  Her BNP was elevated at 2200.  High-sensitivity troponin mildly elevated at 28 but stable on recheck.  -He had a CT PE that showed no PE but diffuse, groundglass changes in both lungs with patchy airspace changes throughout the left lung with areas of consolidation.  No change to known primary neoplasm.  -Patient was given 40 mg of IV Lasix and started on ceftriaxone and azithromycin.  -We will continue ceftriaxone and azithromycin.  Started on prednisone 50 mg daily for possible radiation pneumonitis.  -Holding IV Lasix for now.  Could consider tomorrow but she does not appear severely volume overloaded.  IVC on echocardiogram is not dilated.  -Pulmonary consulted.  Await recommendations.  -Therapies consulted.   Existing Precautions/Restrictions oxygen therapy device and L/min  (requiring 5L at time of eval, on room air at baseline)   Cognitive Status Examination   Orientation Status  orientation to person, place and time   Affect/Mental Status (Cognitive) WFL   Follows Commands WFL   Cognitive Status Comments Pt scored 100% on Short Blessed, appears to be at her cognitive baseline with good safety awareness   Cognitive Screens/Assessments   Cognitive Assessments Completed Other Cognitive Screen/Assessment   Cognitive Assessment Test Short Blessed   Cognitive Assessment Test Score -0   Cognitive Assessment Test Interpretation Pt scored 100% on Short blessed indicating intact cognition, family reports pt has not demonstrated cognitive concerns   Sensory   Sensory Comments Pt reports mild numbness in LUE and BLE from chemo   Pain Assessment   Patient Currently in Pain No   Posture   Posture forward head position;protracted shoulders   Range of Motion Comprehensive   Comment, General Range of Motion WFL   Strength Comprehensive (MMT)   Comment, General Manual Muscle Testing (MMT) Assessment Decline in tolerance for activity   Coordination   Coordination Comments Intact   Bed Mobility   Comment (Bed Mobility) SBA   Transfers   Transfer Comments SBA   Balance   Balance Comments Intact without gait aid   Activities of Daily Living   BADL Assessment/Intervention bathing;lower body dressing;grooming;toileting   Bathing Assessment/Intervention   Comment, (Bathing) SBA with simulated tub transfer, poor tolerance for activity in standing   Lower Body Dressing Assessment/Training   Comment, (Lower Body Dressing) SBA   Grooming Assessment/Training   Comment, (Grooming) Poor tolerance for activity in standing   Toileting   Comment, (Toileting) SBA   Clinical Impression   Criteria for Skilled Therapeutic Interventions Met (OT) Yes, treatment indicated   OT Diagnosis Decline in ADL tolerance   Influenced by the following impairments SOB secondary to PNA   OT Problem List-Impairments impacting ADL problems related to;activity tolerance impaired   Assessment of Occupational Performance 1-3 Performance Deficits    Identified Performance Deficits Impaired tolerance needed for grooming and bathing   Planned Therapy Interventions (OT) ADL retraining;strengthening;progressive activity/exercise   Clinical Decision Making Complexity (OT) problem focused assessment/low complexity   Risk & Benefits of therapy have been explained evaluation/treatment results reviewed;risks/benefits reviewed;care plan/treatment goals reviewed;current/potential barriers reviewed;participants voiced agreement with care plan;participants included;patient   OT Total Evaluation Time   OT Eval, Low Complexity Minutes (39479) 10   OT Goals   Therapy Frequency (OT) Daily   OT Predicted Duration/Target Date for Goal Attainment 07/19/24   OT Goals Cardiac Phase 1   OT: Understanding of cardiac education to maximize quality of life, condition management, and health outcomes Completed  (N/A)   OT: Perform aerobic activity with stable cardiovascular response continuous;15 minutes;ambulation   OT: Functional/aerobic ambulation tolerance with stable cardiovascular response in order to return to home and community environment Supervision/SBA;200 feet   OT: Navigation of stairs simulating home set up with stable cardiovascular response in order to return to home and community environment Supervision/SBA;Greater than 10 stairs;Rail on right   Therapeutic Activities   Therapeutic Activity Minutes (32098) 22   Symptoms noted during/after treatment fatigue;shortness of breath;significant change in vital signs   Treatment Detail/Skilled Intervention Ot; Pt agreeable to therapy. Supine to sit and sit to stand with SBA and cues for line management safety. Pt completed simulated morning routine, ambulating to toilet, completing toilet transfer wit hcues for grab bar use for EC. Pt completed g/h standing at sink  with SBA for 4 minutes. Pt appeared SOB, on 5 L pt O2 dropped to 80%. With seated rest break and education on PLB, pt O2 improved to 90s. Pt ambulated for  approximately 3 minutes, became SOB with O2 dropping to low 80s. With seated rest break  and PLB pt improved to 90s. Pt educated on further OT recommendations, pt agreeable. Pt in bed with call light upon OT departure   OT Discharge Planning   OT Plan Progress ambulation/activity tolerance while monitoring O2 (may need chair follow), stairs when able, EC packets   OT Discharge Recommendation (DC Rec) home with assist;home with outpatient pulmonary rehab   OT Rationale for DC Rec Anticipate pt will be able to progress with medical management and return home with intermittent assist from family for higher level IADL tasks. Pt would benefit from pulm outpatient rehab to progress activity tolerance. Ot will continue to follow   OT Brief overview of current status SBA transfers, O2 drops limiting activity tolerance

## 2024-07-10 NOTE — CONSULTS
[facility]    Pulmonary, Allergy, Critical Care and Sleep Medicine    Initial Consultation  07/10/2024    Dasia Hudson MRN# [id number]   Age: 78 year old YOB: 1945     Date of Admission: 7/9/2024  Reason for Consultation: Lung cancers/p chemoradiation, new hypoxia, concern for radiation pneumonitis  Requesting Team: [facility]    Primary care provider: Devin Viera     Assessment and Recommendations:    Dasia Hudson is a 78 year old female with a history of lung adenocarcinoma metastatic to LNs and brain s/p chemoradiation and stereotactic brain radiation, CAD, CKD, HTN, hypothyroidism, who presented on 7/9/2024 with progressive dyspnea, found to  have new predominantly left lung infiltrates.    Acute Hypoxic Respiratory Failure  Bilateral Ground Glass  Metastatic Left Lung Adenocarcinoma  Progressive dyspnea and cough with left > right ground glass and especially consolidative changes on imaging. Differential of infectious vs inflammatory etiologies. Symptoms appear to have been gradually increasing in the days and weeks since completing chemoradiation and do believe clinical picture consistent with potential radiation pneumonitis. Risk factors of underlying lung disease, prior smoking history, concurrent chemoradiation with paclitaxel. Would also consider radiation associated organizing pneumonia given involvement of right lung. Has been started on steroids, will continue and monitor clinically. Less concern for infection but will do non-invasive work and think appropriate to continue empiric CAP therapy will initiating steroids. Did briefly introduce idea of bronchoscopy as a possibility if develop greater suspicion for infection. Patient reports that would want to think about it before agreeing.   - Will increase prednisone to 60 mg daily  - May be on prolonged steroid course, if so will need to start PJP prophylaxis prior to discharge  - Already on PPI  - Agree  "with  continuing CAP treatment for now  - Sputum culture ordered in case can produce a sample  - Histo, blasto, procal, B-D-glucan, galactomannan ordered, will repeat CRP for trend, RVP already negative    Pulmonary will continue to follow    Martha Connolly MD PhD  Pulmonary and Critical Care     History of Present Illness:    HPI: History taken from patient and chart review.     CHRISTEN nodule, nodular area of subpleural consolidation in LLL, and mixed emphysema and fibrosis seen on CT 10/2023. Repeat CT 2/2024 with 1.1 x 0.6 spiculated CHRISTEN nodule, increase in size of LLL nodular cluster with largest 2.2 cm. Seen in [facility] clinic in early March and proceeded to CT guided biopsy with finding of adenocarcinoma. PET in early April with hypermetabolic mediastinal and left hilar LAD, MRI with two enhancing lesions. Initial Oncology visit 4/10/24. Completed 6 weeks of chemotherapy (started 5/8 paclitaxel 50 Mg/m2 and dose reduced carboplatin) with radiation that finished at that time. 4/18 stereotactic radiation to the left frontal lobe and left temporal lobe lesion. Was on dexamethasone that tapered as part of therapy. Called Oncology clinic  yesterday to report worsening dyspnea and advised to go to ED.     Today reports gradual progressive symptoms over weeks of increased fatigue and some cough. Then in last 1 week to 5 days has had gradual increase in shortness of breath and \"naggy cough\" and decreased energy. Denies fever, chills, chest pain, night sweats, sick contacts. No orthopnea, maybe a little leg edema. Felt pretty good during her treatments. Had radiation mostly to left chest and some to lymph nodes.     Smoked 30-35 yrs at most 1 ppd then quit about 20 years ago. No mold or water damage exposure, no pets. Do tech work. Does garden but  hasn't done recently.     Review of Systems:  Complete 12 point ROS negative unless mentioned in HPI    Histories, Prior to Admission Medications, " Allergies:    Past Medical History:  Lung adenocarcinoma  CKD  HTN  Hypothyroid    Past Surgical History:  Past Surgical History:   Procedure Laterality Date    IR CHEST PORT PLACEMENT > 5 YRS OF AGE  2024     Past Social History:  Social History     Socioeconomic History    Marital status:      Spouse name: Not on file    Number of children: Not on file    Years of education: Not on file    Highest education level: Not on file   Occupational History    Not on file   Tobacco Use    Smoking status: Not on file    Smokeless tobacco: Not on file   Substance and Sexual Activity    Alcohol use: Not on file    Drug use: Not on file    Sexual activity: Not on file   Other Topics Concern    Not on file   Social History Narrative    Not on file     Social Determinants of Health     Financial Resource Strain: Not on file   Food Insecurity: Not on file   Transportation Needs: Not on file   Physical Activity: Not on file   Stress: Not on file   Social Connections: Not on file   Interpersonal Safety: Not on file   Housing Stability: Not on file     Family History:  Family History  Medical History Relation Name Comments   Cancer Birth Father Sergio     Cancer, Stomach Birth Father Sergio  at age 39   Depression Birth Mother Alee     Depression Brother 1 Serjio     Cataract Brother 2 Unruly     Depression Sister 1 Amarilis     Diabetes, Type II Sister 1 Amarilis     Peripheral Vascular Sister 1 Amarilis Clot, prior injury, amputation   Depression Sister 2 Rose     Diabetes Sister 2 Rose     Diabetes, Type II Sister 3 Binta     Peripheral Vascular Sister 3 Binta     Cancer, Lung Negative       Medications:  Current Facility-Administered Medications   Medication Dose Route Frequency Provider Last Rate Last Admin    aspirin (ASA) chewable tablet 81 mg  81 mg Oral QPM Guzman Alvarez MD   81 mg at 24 2250    azithromycin (ZITHROMAX) tablet 250 mg  250 mg Oral Daily Guzman Alvarez MD   250 mg at 07/10/24 3446     carvedilol (COREG) tablet 12.5 mg  12.5 mg Oral BID w/meals Guzman Alvarez MD        cefTRIAXone (ROCEPHIN) 1 g vial to attach to  mL bag for ADULTS or NS 50 mL bag for PEDS  1 g Intravenous Q24H Guzman Alvarez MD        enoxaparin ANTICOAGULANT (LOVENOX) injection 40 mg  40 mg Subcutaneous Q24H Guzman Alvarez MD   40 mg at 07/09/24 2252    ketotifen fumarate 0.035% ophthalmic solution 1 drop  1 drop Both Eyes BID Guzman Alvarez MD   1 drop at 07/10/24 0749    levothyroxine (SYNTHROID/LEVOTHROID) tablet 75 mcg  75 mcg Oral QAM AC Guzman Alvarez MD   75 mcg at 07/10/24 0746    pantoprazole (PROTONIX) EC tablet 40 mg  40 mg Oral Daily Guzman Alvarez MD   40 mg at 07/10/24 0746    potassium chloride (KAYCIEL) solution 20 mEq  20 mEq Oral or Feeding Tube Once Osman Mcnally MD        potassium chloride (KAYCIEL) solution 40 mEq  40 mEq Oral or Feeding Tube Once Osman Mcnally MD        predniSONE (DELTASONE) tablet 50 mg  50 mg Oral Daily Osman Mcnally MD   50 mg at 07/10/24 1221    rosuvastatin (CRESTOR) tablet 40 mg  40 mg Oral At Bedtime Guzman Alvarez MD   40 mg at 07/09/24 2250    sodium chloride (PF) 0.9% PF flush 3 mL  3 mL Intracatheter Q8H Guzman Alvarez MD         Current Facility-Administered Medications   Medication Dose Route Frequency Provider Last Rate Last Admin    acetaminophen (TYLENOL) tablet 975 mg  975 mg Oral Q8H PRN Guzman Alvarez MD        calcium carbonate (TUMS) chewable tablet 1,000 mg  1,000 mg Oral 4x Daily PRN Guzman Alvarez MD        lidocaine (LMX4) cream   Topical Q1H PRN Guzman Alvarez MD        lidocaine 1 % 0.1-1 mL  0.1-1 mL Other Q1H PRN Guzman Alvarez MD        ondansetron (ZOFRAN ODT) ODT tab 4 mg  4 mg Oral Q6H PRN Guzman Alvarez MD        Or    ondansetron (ZOFRAN) injection 4 mg  4 mg Intravenous Q6H PRN Guzman Alvarez MD        senna-docusate (SENOKOT-S/PERICOLACE) 8.6-50 MG per tablet 1 tablet  1 tablet Oral BID PRN Guzman Alvarez MD        Or     senna-docusate (SENOKOT-S/PERICOLACE) 8.6-50 MG per tablet 2 tablet  2 tablet Oral BID PRN Guzman Alvarez MD        sodium chloride (PF) 0.9% PF flush 3 mL  3 mL Intracatheter q1 min prn Guzman Alvarez MD         Allergies:   No Known Allergies  Physical Exam:    Temp:  [97.5  F (36.4  C)-98.3  F (36.8  C)] 97.8  F (36.6  C)  Pulse:  [75-87] 75  Resp:  [16-18] 18  BP: ()/(48-83) 112/66  SpO2:  [74 %-97 %] 97 %    Intake/Output Summary (Last 24 hours) at 7/10/2024 1320  Last data filed at 7/10/2024 0400  Gross per 24 hour   Intake 250 ml   Output 900 ml   Net -650 ml     General: laying in bed in NAD  HEENT: anicteric, moist mucosa  Neck: no palpable lymphadenopathy, no JVD noted  Chest: Moving air, bilateral crackles, no wheezing  Cardiac: RRR no murmurs  Abdomen: Soft, flat, non tender, active BS  Extremities: Trace LE Edema  Neuro: A&Ox3, no focal deficits   Skin: no rash noted    Laboratory, imaging, and microbiologic data:    All laboratory and imaging data reviewed, pertinent results discussed above.

## 2024-07-10 NOTE — ED NOTES
Assisted pt to the bedside commode, pt tolerated well, but O2 dropped to 71% RA when returned to bed. No labor breathing, no retraction. Pt O2 resumed to normal limits when placed back to 2L of NC.

## 2024-07-10 NOTE — PROGRESS NOTES
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    #Acute hypoxemic respiratory failure secondary to suspected radiation pneumonitis versus community-acquired pneumonia: Medical history notable for left lower lobe adenocarcinoma with bulky mediastinal lymphadenopathy and 2 brain metastases that were ultimately diagnosed in April 2024.  Ms. Hudson smoked for about 40 pack years and quit in about 2005 when she had a myocardial infarction.  Her RCA was stented at that time and other less obstructive disease was noted.  She also has CKD stage III, essential hypertension and dyslipidemia.  She notes that she received chemoradiation that ended in early June.  Since completion, she has had progressive shortness of breath particularly with exertion.  She lives alone in a single-family home.  She has had difficulty with day-to-day activity.  He denies chest pain.  No clear fevers.  She has been coughing without much production.  -In the ER, patient afebrile and normotensive.  Saturations in the high 80s on room air. CBC without leukocytosis.  BMP with creatinine close to baseline at 1.37.  Sodium mildly low 128, potassium 3.2.  Her BNP was elevated at 2200.  High-sensitivity troponin mildly elevated at 28 but stable on recheck.  -He had a CT PE that showed no PE but diffuse, groundglass changes in both lungs with patchy airspace changes throughout the left lung with areas of consolidation.  No change to known primary neoplasm.  -Patient was given 40 mg of IV Lasix and started on ceftriaxone and azithromycin.  -We will continue ceftriaxone and azithromycin.  Started on prednisone 50 mg daily for  possible radiation pneumonitis.  -Holding IV Lasix for now.  Could consider tomorrow but she does not appear severely volume overloaded.  IVC on echocardiogram is not dilated.  -Pulmonary consulted.  Await recommendations.  -Therapies consulted.     #Mild elevation of Hstroponin. Akinetic inferior and basal inferoseptal wall.  Hypertension, hyperlipidemia: Patient with mild elevation of her high-sensitivity troponin.  Stable on recheck.  Echocardiogram showed EF 50-55% with basal inferior, inferolateral and basal inferoseptal walls there were hypokinetic to akinetic.  It looks like this was noted on echocardiogram as well in 2022 in Care Everywhere.  -Continue on her home carvedilol, aspirin, statin.    #Lung cancer, with primary in the left lower lobe, assoc bulky mediastinal ELPIDIO and two brain tumors at the time of diagnosis: Followed through [facility].  Pt has been treated with carboplatin (reduced dose) and paclitaxel along with radiation therapy starting on 5/8 and with the last XRT on 6/19/2024.  Also was treated with stereotactic radiation to left frontal and temporal lobe lesions in April.  -Patient should follow-up with her outpatient oncologist.    #Hypokalemia: Replacement protocol  #Hyponatremia: Mild. Monitor.   #CKD3: Looks like baseline creatinine is 1.2-1.5.  Creatinine at baseline.  Monitor.    DVT Prophylaxis: Enoxaparin (Lovenox) SQ  Code Status: No CPR- Pre-arrest intubation OK  Dispo: Likely 1-2 more days. Pending therapy recs. Wean O2 as able.     Grandson at bedside and updated.    Osman Mcnally MD    Interval History   Assumed care.  Patient notes she feels about the same.  She notes that is difficult to tell if she is feeling better as she has not been up and moving yet.  Denies chest pain.  No nausea vomiting.  No fevers or chills.  Patient reiterates slow progression of her shortness of breath and not sudden shortness of breath that started in the last week or so.  She notes that it  really started after she finished her chemoradiation in early June.    -Data reviewed today: I reviewed all new labs and imaging results over the last 24 hours. I personally reviewed     Physical Exam   Temp: 97.8  F (36.6  C) Temp src: Oral BP: 112/66 Pulse: 75   Resp: 18 SpO2: 97 % O2 Device: Nasal cannula Oxygen Delivery: 4 LPM  Vitals:    07/09/24 1734   Weight: 69.4 kg (153 lb)     Vital Signs with Ranges  Temp:  [97.5  F (36.4  C)-98.3  F (36.8  C)] 97.8  F (36.6  C)  Pulse:  [75-87] 75  Resp:  [16-18] 18  BP: ()/(48-83) 112/66  SpO2:  [74 %-97 %] 97 %  I/O last 3 completed shifts:  In: 250 [IV Piggyback:250]  Out: 900 [Urine:900]    Constitutional: Deconditioned.  No acute distress.  Answers appropriately.  HEENT: Normocephalic. MMM, No elevation of JVD noted.   Respiratory: Nl WOB, she has inspiratory crackles bilaterally.  More coarse at the left base.  No wheeze.  Cardiovascular: Regular, no murmur  GI: BS+, NT, ND  Skin/Integumen: WWP, no rash.  Mild bilateral edema.  Neuro: CNII-XII intact. Moves all extremities. No tremor. A&Ox3.    Medications   Current Facility-Administered Medications   Medication Dose Route Frequency Provider Last Rate Last Admin     Current Facility-Administered Medications   Medication Dose Route Frequency Provider Last Rate Last Admin    aspirin (ASA) chewable tablet 81 mg  81 mg Oral QPM Guzman Alvarez MD   81 mg at 07/09/24 2250    azithromycin (ZITHROMAX) tablet 250 mg  250 mg Oral Daily Guzman Alvarez MD   250 mg at 07/10/24 0746    carvedilol (COREG) tablet 12.5 mg  12.5 mg Oral BID w/meals Guzman Alvarez MD        cefTRIAXone (ROCEPHIN) 1 g vial to attach to  mL bag for ADULTS or NS 50 mL bag for PEDS  1 g Intravenous Q24H Guzman Alvarez MD        enoxaparin ANTICOAGULANT (LOVENOX) injection 40 mg  40 mg Subcutaneous Q24H Guzman Alvarez MD   40 mg at 07/09/24 2252    ketotifen fumarate 0.035% ophthalmic solution 1 drop  1 drop Both Eyes BID Guzman Alvarez,  MD   1 drop at 07/10/24 0749    levothyroxine (SYNTHROID/LEVOTHROID) tablet 75 mcg  75 mcg Oral QAM AC Guzman Alvarez MD   75 mcg at 07/10/24 0746    pantoprazole (PROTONIX) EC tablet 40 mg  40 mg Oral Daily Guzman Alvarez MD   40 mg at 07/10/24 0746    potassium chloride (KAYCIEL) solution 20 mEq  20 mEq Oral or Feeding Tube Once Osman Mcnally MD        potassium chloride (KAYCIEL) solution 40 mEq  40 mEq Oral or Feeding Tube Once Osman Mcnally MD        predniSONE (DELTASONE) tablet 50 mg  50 mg Oral Daily Osman Mcnally MD   50 mg at 07/10/24 1221    rosuvastatin (CRESTOR) tablet 40 mg  40 mg Oral At Bedtime Guzman Alvarez MD   40 mg at 07/09/24 2250    sodium chloride (PF) 0.9% PF flush 3 mL  3 mL Intracatheter Q8H Guzman Alvarez MD           Data   Recent Labs   Lab 07/10/24  0837 07/09/24 1956 07/09/24  1744   WBC 9.5  --  9.8   HGB 11.8  --  11.9   MCV 87  --  87     --  408   *  --  128*   POTASSIUM 2.9*  --  3.2*   CHLORIDE 92*  --  90*   CO2 25  --  25   BUN 19.2  --  22.3   CR 1.39* 1.41* 1.37*   ANIONGAP 14  --  13   OMAR 8.2*  --  8.7*   GLC 99  --  104*   ALBUMIN 2.6*  --  2.8*   PROTTOTAL 6.2*  --  6.3*   BILITOTAL 0.3  --  0.6   ALKPHOS 73  --  78   ALT 45  --  52*   AST 83*  --  101*       Recent Results (from the past 24 hour(s))   CT Chest Pulmonary Embolism w Contrast    Narrative    EXAM: CT CHEST PULMONARY EMBOLISM W CONTRAST  LOCATION: [facility]  DATE: 7/9/2024    INDICATION: Dyspnea on exertion. Elevated d-dimer.  COMPARISON: Contrast enhanced chest CT 4/29/2024  TECHNIQUE: CT chest pulmonary angiogram during arterial phase injection of IV contrast. Multiplanar reformats and MIP reconstructions were performed. Dose reduction techniques were used.   CONTRAST: 55mL Isovue 370    FINDINGS:  ANGIOGRAM CHEST: Pulmonary arteries are normal caliber and negative for pulmonary emboli. Mild calcified atherosclerotic changes of the thoracic aorta. No thoracic aortic  aneurysm.     LUNGS AND PLEURA: Stable, moderate centrilobular emphysema. New, diffuse groundglass changes throughout both lungs. New patchy airspace changes throughout the left lung with areas of consolidation. Approximately 1.9 x 1.0 cm peripheral nodule at the   posterior aspect of the left lower lobe with an indwelling fiducial marker. On the prior CT this measured 2.1 x 1.1 cm. No pleural effusions.    MEDIASTINUM/AXILLAE: Right internal jugular port with catheter tip at the low SVC. Stable left hilar adenopathy. A representative lymph node measures 1.6 cm. No new lymphadenopathy.    CORONARY ARTERY CALCIFICATION: Moderate.    UPPER ABDOMEN: Stable left hepatic lobe cyst. Stable nodular prominence of the left adrenal. Partially visualized abdominal aortic aneurysm.    MUSCULOSKELETAL: No suspicious osseous lesions.      Impression    IMPRESSION:  1.  No pulmonary emboli.  2.  New, diffuse groundglass changes throughout both lungs. New patchy airspace changes throughout the left lung with areas of consolidation. This is likely secondary to infectious or inflammatory pneumonitis.  3.  No significant change in the left lower lobe peripheral nodular density representing the known primary neoplasm. Indwelling fiducial marker.  4.  Stable left hilar adenopathy.  5.  Partially visualized abdominal aortic aneurysm.   Echocardiogram Complete   Result Value    LVEF  50-55%    Narrative    [id number]  [id number]  [id number]  [id number]^CIELO^ALTON^JENNIFER     [facility]  Echocardiography Laboratory  [address] [place] [address]     Name: BLAIR BRAND  MRN: [id number]  : 1945  Study Date: 07/10/2024 08:59 AM  Age: 78 yrs  Gender: Female  Patient Location: [facility]  Reason For Study: CROWLEY  Ordering Physician: ALTON BUCK  Performed By: TYSHAWN Back     BSA: 1.7 m2  Height: 64 in  Weight: 153 lb  HR: 83  BP: 112/66  mmHg  ______________________________________________________________________________  Procedure  Complete Portable Echo Adult. Optison (NDC #[id number]) given intravenously.  ______________________________________________________________________________  Interpretation Summary     The visual ejection fraction is 50-55%.  There are regional wall motion abnormalities as specified.  There is moderate (2+) mitral regurgitation.  There is mild (1+) aortic regurgitation.  The study was technically difficult.  ______________________________________________________________________________  Left Ventricle  The left ventricle is normal in size. There is normal left ventricular wall  thickness. Diastolic Doppler findings (E/E' ratio and/or other parameters)  suggest left ventricular filling pressures are increased. The visual ejection  fraction is 50-55%. The mid to basal inferior, inferolateral and basal  inferoseptal walls appears to be severely hypokinetic to akinetic. There are  regional wall motion abnormalities as specified.     Right Ventricle  The right ventricle is normal in size and function.     Atria  Normal left atrial size. Right atrial size is normal. There is no color  Doppler evidence of an atrial shunt.     Mitral Valve  There is moderate (2+) mitral regurgitation. The mitral regurgitant jet is  anteriorly directed, which is consistent with posterior leaflet pathology. The  mitral regurgitant jet is also directed along the interatrial septum.     Tricuspid Valve  There is trace tricuspid regurgitation. Right ventricular systolic pressure  could not be approximated due to inadequate tricuspid regurgitation.     Aortic Valve  The aortic valve is trileaflet. There is mild trileaflet aortic sclerosis.  There is mild (1+) aortic regurgitation. No hemodynamically significant  valvular aortic stenosis.     Pulmonic Valve  There is trace pulmonic valvular regurgitation. Normal pulmonic valve  velocity.      Vessels  IVC diameter <2.1 cm collapsing >50% with sniff suggests a normal RA pressure  of 3 mmHg.     Pericardium  There is no pericardial effusion.     Rhythm  Sinus rhythm was noted.  ______________________________________________________________________________  MMode/2D Measurements & Calculations     IVSd: 0.85 cm  LVIDd: 4.9 cm  LVIDs: 3.5 cm  LVPWd: 0.91 cm  FS: 29.5 %  LV mass(C)d: 149.5 grams  LV mass(C)dI: 85.6 grams/m2  Ao root diam: 2.8 cm  asc Aorta Diam: 3.2 cm  LVOT diam: 2.0 cm  LVOT area: 3.0 cm2  Ao root diam index Ht(cm/m): 1.7  Ao root diam index BSA (cm/m2): 1.6  Asc Ao diam index BSA (cm/m2): 1.8  Asc Ao diam index Ht(cm/m): 2.0  LA Volume (BP): 42.8 ml     LA Volume Index (BP): 24.5 ml/m2  RWT: 0.37  TAPSE: 2.4 cm     Doppler Measurements & Calculations  MV E max adarsh: 74.9 cm/sec  MV A max adarsh: 93.3 cm/sec  MV E/A: 0.80  MV max P.4 mmHg  MV mean P.3 mmHg  MV V2 VTI: 23.3 cm  MV dec time: 0.18 sec  AI P1/2t: 482.5 msec  MR PISA: 8.0 cm2  PA V2 max: 93.7 cm/sec  PA max PG: 3.5 mmHg  PA acc time: 0.11 sec  E/E' av.2  Lateral E/e': 9.2  Medial E/e': 17.2     RV S Adarsh: 12.2 cm/sec     ______________________________________________________________________________  Report approved by: Trae Carvajal 07/10/2024 10:54 AM

## 2024-07-10 NOTE — H&P
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  She comes in at this time with increasing shortness of breath over the past several weeks that has begun to really interfere with her function.    Medical history notable for left lower lobe adenocarcinoma with bulky mediastinal lymphadenopathy and 2 brain metastases that were ultimately diagnosed in April 2024.  Ms. Hudson smoked for about 40 pack years and quit in about 2005 when she had a myocardial infarction.  Her RCA was stented at that time and other less obstructive disease was noted.  She also has CKD stage III, essential hypertension and dyslipidemia.    On presentation to the emergency department, VS: /77, HR 85, RR 17 with oxygen saturation 88% breathing room air.  Temperature is 98.3.  Examination: Patient actually looks very good.  No JVD.  Heart is regular without murmur or gallop.  Lungs are clear but with some but distant breath sounds.  There is perhaps trace lower extremity edema.    Labs: Creatinine 1.41, sodium 128, potassium 3.2, chloride 90, calcium 8.7, albumin 2.8, protein 6.3 /ALT 52.  N-terminal proBNP 2184.  Troponin T is 28 with a delta also of 28.  WBC 9.8, Hgb 11.9, .  D-dimer 1.6.  Blood cultures were obtained.  Imaging: CT chest PE protocol: \"No pulmonary emboli...diffuse groundglass changes throughout both lungs. New patchy airspace changes throughout the left lung with areas of consolidation...No significant change in the left lower lobe peripheral nodular density representing the known primary neoplasm. Indwelling " "fiducial marker. Stable left hilar adenopathy.\"    DX:   Acute hypoxic respiratory failure, mild with significantly increased CROWLEY over the past 4-6 weeks or so.  Lung cancer, with primary in the left lower lobe, assoc bulky mediastinal ELPIDIO and two brain tumors at the time of diagnosis. Pt has been treated with carboplatin (reduced dose) and paclitaxel along with radiation therapy starting on 5/8 and with the last XRT on 6/19/2024.  Also was treated with stereotactic radiation to left frontal and temporal lobe lesions in April.  CKD stage 3b.   CAD.  No recently active disease.      Plan:   Admit to inpatient.   Continue abx and check resp viral panel. However, I do not think this is infectious due to the long course of onset.   Suspecting radiation pneumonitis is playing a major role (appropriate time, diffuse involvement, though I am not certain about the radiation field).  Start empiric prednisone 50 mg daily (0.75 mg/kg daily).  Echocardiogram. I do not think that empiric diuretics will be very helpful, but note that she did already get a dose of lasix tonight. If she's much improved in the am, then addition diuretics would be a reasonable idea.   Pulmonary medicine consult to help w management of the lung infiltrates.  Consider involving oncology but pt is managed through [facility].             Chief Complaint:     Shortnes of breath and hypoxia     History is obtained from the patient, electronic health record, and emergency department physician.  She was also seen in the presence of her adult grandson.     Dasia Hudson came to attention today after [place] 4 weekend.  She initially described 5 days of increasing shortness of breath but clarifies that she has felt that the shortness of breath has been quite dramatic and so limiting that she could not ignore it anymore.  She indicates that she has been progressively more short of breath over at least the last 3 weeks.  " "She has not had much of a cough except for occasionally coughing up some sputum.  She has not had fevers, sweats or chills.  She has not had much of an appetite.    No problems with nausea, vomiting, diarrhea or constipation.  She is able to ambulate without any difficulty and typically lives alone.  However, she is now so short of breath that she is unable to go up a flight of stairs even to take care of her laundry.  She has not had any PND or orthopnea.  She has mild lower extremity edema.        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         Social History:   Smoked for approximately 40 pack years and quit in 2005  Drinks about 3 alcoholic beverages per week         Family History:   Reviewed         Allergies:   No Known Allergies          Medications:   Aspirin 81 mg every other day  Carvedilol 12.5 mg twice daily  Ketotifen eyedrops  Synthroid 75 mcg daily  Omeprazole 20 mg daily  Crestor 40 mg daily         Review of Systems:     A comprehensive review of systems was performed and found to be negative except as described in this note           Physical Exam:   Vitals were reviewed  Temp: 98.1  F (36.7  C) Temp src: Oral BP: 96/61 Pulse: 80   Resp: 16 SpO2: 92 % O2 Device: Nasal cannula Oxygen Delivery: 2 LPM  Constitutional: Awake, alert, cooperative, no apparent distress, and appears stated age.  Ms. Hudson is very pleasant and interactive.  She is fully appropriate to the situation.  Appears well-nourished.  Speech is articulate, word choice and thought processes are intact.  Eyes: Lids and lashes normal, pupils equal, round and reactive to light, extra ocular muscles intact, sclera clear, " conjunctiva normal.  ENT: Normocephalic, without obvious abnormality, atraumatic.  No facial muscular asymmetry.  Neck: Supple, symmetrical, trachea midline, no adenopathy, thyroid symmetric, not enlarged and no tenderness, skin normal.  Hematologic / Lymphatic: No cervical lymphadenopathy and no supraclavicular lymphadenopathy.  Back: Symmetric, no curvature, spinous processes are non-tender on palpation, paraspinous muscles are non-tender on palpation, no costal vertebral tenderness.  Lungs: No increased work of breathing, good air exchange, clear to auscultation bilaterally, no crackles or wheezing.  Cardiovascular: Tacky and mildly irregular.  Abdomen: Normal bowel sounds, soft, non-distended, non-tender, no masses palpated, no hepatosplenomegaly.  Musculoskeletal: No redness, warmth, or swelling of the joints. Tone is normal.  Neurologic: Awake, alert, oriented to name, place and time.  Cranial nerves II-XII are grossly intact.    Neuropsychiatric: Normal affect, mood, orientation, memory and insight.  Skin: No rashes, erythema, pallor, petechia or purpura.          Data:     Results for orders placed or performed during the hospital encounter of 07/09/24 (from the past 24 hour(s))   [place] Draw    Narrative    The following orders were created for panel order [place] Draw.  Procedure                               Abnormality         Status                     ---------                               -----------         ------                     Extra Blue Top Tube[[id number]]                              Final result               Extra Red Top Tube[[id number]]                               Final result               Extra Green Top (Lithium...[[id number]]                      Final result               Extra Purple Top Tube[[id number]]                            Final result               Extra Heparinized Syringe[[id number]]                        Final result               Extra Green Top (Lithium...[[id number]]                       Final result                 Please view results for these tests on the individual orders.   Extra Blue Top Tube   Result Value Ref Range    Hold Specimen JIC    Extra Red Top Tube   Result Value Ref Range    Hold Specimen JIC    Extra Green Top (Lithium Heparin) Tube   Result Value Ref Range    Hold Specimen JIC    Extra Purple Top Tube   Result Value Ref Range    Hold Specimen JIC    Extra Heparinized Syringe   Result Value Ref Range    Hold Specimen JIC    Extra Green Top (Lithium Heparin) ON ICE   Result Value Ref Range    Hold Specimen JIC    CBC with platelets differential    Narrative    The following orders were created for panel order CBC with platelets differential.  Procedure                               Abnormality         Status                     ---------                               -----------         ------                     CBC with platelets and d...[[id number]]  Abnormal            Final result                 Please view results for these tests on the individual orders.   D dimer quantitative   Result Value Ref Range    D-Dimer Quantitative 1.60 (H) 0.00 - 0.50 ug/mL FEU    Narrative    This D-dimer assay is intended for use in conjunction with a clinical pretest probability assessment model to exclude pulmonary embolism (PE) and deep venous thrombosis (DVT) in outpatients suspected of PE or DVT. The cut-off value is 0.50 ug/mL FEU.    For patients 50 years of age or older, the application of age-adjusted cut-off values for D-Dimer may increase the specificity without significant effect on sensitivity. The literature suggested calculation age adjusted cut-off in ug/L = age in years x 10 ug/L. The results in this laboratory are reported as ug/mL rather than ug/L. The calculation for age adjusted cut off in ug/mL= age in years x 0.01 ug/mL. For example, the cut off for a 76 year old male is 76 x 0.01 ug/mL = 0.76 ug/mL (760 ug/L).    CHRIS Weldon et al. Age adjusted D-dimer  cut-off levels to rule out pulmonary embolism: The ADJUST-PE Study. NAVID 2014;311:2506-5620.; HJ Merlene et al. Diagnostic accuracy of conventional or age adjusted D-dimer cutoff values in older patients with suspected venous thromboembolism. Systemic review and meta-analysis. BMJ 2013:346:f2492.   Comprehensive metabolic panel   Result Value Ref Range    Sodium 128 (L) 135 - 145 mmol/L    Potassium 3.2 (L) 3.4 - 5.3 mmol/L    Carbon Dioxide (CO2) 25 22 - 29 mmol/L    Anion Gap 13 7 - 15 mmol/L    Urea Nitrogen 22.3 8.0 - 23.0 mg/dL    Creatinine 1.37 (H) 0.51 - 0.95 mg/dL    GFR Estimate 40 (L) >60 mL/min/1.73m2    Calcium 8.7 (L) 8.8 - 10.2 mg/dL    Chloride 90 (L) 98 - 107 mmol/L    Glucose 104 (H) 70 - 99 mg/dL    Alkaline Phosphatase 78 40 - 150 U/L     (H) 0 - 45 U/L    ALT 52 (H) 0 - 50 U/L    Protein Total 6.3 (L) 6.4 - 8.3 g/dL    Albumin 2.8 (L) 3.5 - 5.2 g/dL    Bilirubin Total 0.6 <=1.2 mg/dL   Troponin T, High Sensitivity   Result Value Ref Range    Troponin T, High Sensitivity 28 (H) <=14 ng/L   Nt probnp inpatient (BNP)   Result Value Ref Range    N terminal Pro BNP Inpatient 2,184 (H) 0 - 1,800 pg/mL   CBC with platelets and differential   Result Value Ref Range    WBC Count 9.8 4.0 - 11.0 10e3/uL    RBC Count 3.97 3.80 - 5.20 10e6/uL    Hemoglobin 11.9 11.7 - 15.7 g/dL    Hematocrit 34.7 (L) 35.0 - 47.0 %    MCV 87 78 - 100 fL    MCH 30.0 26.5 - 33.0 pg    MCHC 34.3 31.5 - 36.5 g/dL    RDW 18.0 (H) 10.0 - 15.0 %    Platelet Count 408 150 - 450 10e3/uL    % Neutrophils 80 %    % Lymphocytes 2 %    % Monocytes 15 %    % Eosinophils 0 %    % Basophils 0 %    % Immature Granulocytes 2 %    NRBCs per 100 WBC 0 <1 /100    Absolute Neutrophils 7.8 1.6 - 8.3 10e3/uL    Absolute Lymphocytes 0.2 (L) 0.8 - 5.3 10e3/uL    Absolute Monocytes 1.5 (H) 0.0 - 1.3 10e3/uL    Absolute Eosinophils 0.0 0.0 - 0.7 10e3/uL    Absolute Basophils 0.0 0.0 - 0.2 10e3/uL    Absolute Immature Granulocytes 0.2 <=0.4  10e3/uL    Absolute NRBCs 0.0 10e3/uL   EKG 12-lead, tracing only   Result Value Ref Range    Systolic Blood Pressure  mmHg    Diastolic Blood Pressure  mmHg    Ventricular Rate 81 BPM    Atrial Rate 81 BPM    MD Interval 180 ms    QRS Duration 112 ms     ms    QTc 448 ms    P Axis 82 degrees    R AXIS 61 degrees    T Axis 21 degrees    Interpretation ECG       Sinus rhythm with occasional Premature ventricular complexes  Low voltage QRS  Cannot rule out Anterior infarct (cited on or before 10-SEP-2005)  Abnormal ECG  When compared with ECG of 12-SEP-2005 13:12,  Sinus rhythm has replaced Ectopic atrial rhythm  QRS duration has increased  Minimal criteria for Inferior infarct are no longer Present  Questionable change in initial forces of Anterior leads  T wave inversion less evident in Inferior leads     CT Chest Pulmonary Embolism w Contrast    Narrative    EXAM: CT CHEST PULMONARY EMBOLISM W CONTRAST  LOCATION: [facility]  DATE: 7/9/2024    INDICATION: Dyspnea on exertion. Elevated d-dimer.  COMPARISON: Contrast enhanced chest CT 4/29/2024  TECHNIQUE: CT chest pulmonary angiogram during arterial phase injection of IV contrast. Multiplanar reformats and MIP reconstructions were performed. Dose reduction techniques were used.   CONTRAST: 55mL Isovue 370    FINDINGS:  ANGIOGRAM CHEST: Pulmonary arteries are normal caliber and negative for pulmonary emboli. Mild calcified atherosclerotic changes of the thoracic aorta. No thoracic aortic aneurysm.     LUNGS AND PLEURA: Stable, moderate centrilobular emphysema. New, diffuse groundglass changes throughout both lungs. New patchy airspace changes throughout the left lung with areas of consolidation. Approximately 1.9 x 1.0 cm peripheral nodule at the   posterior aspect of the left lower lobe with an indwelling fiducial marker. On the prior CT this measured 2.1 x 1.1 cm. No pleural effusions.    MEDIASTINUM/AXILLAE: Right internal jugular port  with catheter tip at the low SVC. Stable left hilar adenopathy. A representative lymph node measures 1.6 cm. No new lymphadenopathy.    CORONARY ARTERY CALCIFICATION: Moderate.    UPPER ABDOMEN: Stable left hepatic lobe cyst. Stable nodular prominence of the left adrenal. Partially visualized abdominal aortic aneurysm.    MUSCULOSKELETAL: No suspicious osseous lesions.      Impression    IMPRESSION:  1.  No pulmonary emboli.  2.  New, diffuse groundglass changes throughout both lungs. New patchy airspace changes throughout the left lung with areas of consolidation. This is likely secondary to infectious or inflammatory pneumonitis.  3.  No significant change in the left lower lobe peripheral nodular density representing the known primary neoplasm. Indwelling fiducial marker.  4.  Stable left hilar adenopathy.  5.  Partially visualized abdominal aortic aneurysm.   Troponin T, High Sensitivity   Result Value Ref Range    Troponin T, High Sensitivity 28 (H) <=14 ng/L   Creatinine   Result Value Ref Range    Creatinine 1.41 (H) 0.51 - 0.95 mg/dL    GFR Estimate 38 (L) >60 mL/min/1.73m2      All cardiac studies reviewed by me.   All imaging studies reviewed by me.      Attestation:  I have reviewed today's vital signs, notes, medications, labs and imaging.     Guzman Alvarez MD

## 2024-07-10 NOTE — ED PROVIDER NOTES
ED APC SUPERVISION NOTE:   I evaluated this patient in conjunction with Corona Badillo PA-C  I have participated in the care of the patient and personally performed key elements of the history, exam, and medical decision making.      HPI:   Dasia Hudson is a 78 year old female presents with shortness of breath.  Patient has metastatic adenocarcinoma the lung to the brain.  She has been undergoing chemo and radiation.  She has had progressively worsening shortness of breath particular the last 3 days.  There is a mild associated cough without production of sputum.  She denies any fever or associated chest pain.  She has noted mild lower extremity edema.  She has no history of DVT, PE, hemoptysis, estrogen use.      Independent Historian:   None    Review of External Notes: I reviewed oncology note from 6/18/2024.  Patient has stage IV oligometastatic adenocarcinoma of the lung.  She has completed 6 weeks of chemotherapy with radiation.  She was on paclitaxel and carboplatin     EXAM:       HEENT:    Oropharynx is moist  Eyes:    Conjunctiva normal  Neck:     Supple, no meningismus.     CV:     Regular rate and rhythm.      No rubs or gallops.       No unilateral leg swelling.       2+ radial pulses bilateral.       1+ bilateral lower extremity edema.  PULM:    Diminished breath sounds at the bases right greater than left     Inspiratory Rales left greater than right     No respiratory distress.      No wheezing.     No stridor.  ABD:    Soft, non-tender, non-distended.       No pulsatile masses.       No rebound, guarding or rigidity.  MSK:     No gross deformity to all four extremities.   LYMPH:   No cervical lymphadenopathy.  NEURO:   Alert. Good muscle tone, no atrophy.  Skin:    Warm, dry and intact.    Psych:    Mood is good and affect is appropriate.      Independent Interpretation (X-rays, CTs, rhythm strip):  None    Consultations/Discussion of Management or Tests:  None     Social Determinants of Health  affecting care:   None     MEDICAL DECISION MAKING/ASSESSMENT AND PLAN:       78-year-old female with metastatic adenocarcinoma of the lung status posttreatment of paclitaxel and carboplatin presents with progressively worsening shortness of breath.  No evidence of acute bronchospasm.  EKG without dysrhythmia.  Basic laboratory studies revealed an elevated D-dimer prompting CT scan of the chest.  She is without pulmonary embolism but reveals diffuse groundglass changes throughout both lungs although worse on the left.  It is unclear whether this represents atypical infection, inflammatory process or edema.  Patient covered for community-acquired pneumonia with ceftriaxone and azithromycin.      In event of developing heart failure related to chemotherapeutic agents particularly paclitaxel, patient given Lasix.  BNP is elevated with novel peripheral edema to lower extremities and associated hyponatremia.  Patient will likely benefit from echocardiogram.  We will defer further investigation to hospital medicine service.  Patient require hospitalization fore the aforementioned findings and associated hypoxia.     DIAGNOSIS:     ICD-10-CM    1. Acute congestive heart failure, unspecified heart failure type (H)  I50.9       2. Community acquired bacterial pneumonia  J15.9       3. Hypoxia  R09.02                DISPOSITION:   Admit to monitored bed     Dex Knight MD  7/9/2024  [facility] EMERGENCY DEPT     Dex Knight MD  07/09/24 [id number]

## 2024-07-11 ENCOUNTER — APPOINTMENT (OUTPATIENT)
Dept: OCCUPATIONAL THERAPY | Facility: CLINIC | Age: 79
DRG: 205 | End: 2024-07-11
Payer: COMMERCIAL

## 2024-07-11 LAB
ANION GAP SERPL CALCULATED.3IONS-SCNC: 15 MMOL/L (ref 7–15)
BUN SERPL-MCNC: 17.9 MG/DL (ref 8–23)
CALCIUM SERPL-MCNC: 9 MG/DL (ref 8.8–10.2)
CHLORIDE SERPL-SCNC: 94 MMOL/L (ref 98–107)
CREAT SERPL-MCNC: 1.13 MG/DL (ref 0.51–0.95)
CRP SERPL-MCNC: 123.82 MG/L
DEPRECATED HCO3 PLAS-SCNC: 23 MMOL/L (ref 22–29)
EGFRCR SERPLBLD CKD-EPI 2021: 50 ML/MIN/1.73M2
ERYTHROCYTE [DISTWIDTH] IN BLOOD BY AUTOMATED COUNT: 18.4 % (ref 10–15)
GLUCOSE SERPL-MCNC: 161 MG/DL (ref 70–99)
HCT VFR BLD AUTO: 37.2 % (ref 35–47)
HGB BLD-MCNC: 12.6 G/DL (ref 11.7–15.7)
MAGNESIUM SERPL-MCNC: 2.2 MG/DL (ref 1.7–2.3)
MCH RBC QN AUTO: 29.8 PG (ref 26.5–33)
MCHC RBC AUTO-ENTMCNC: 33.9 G/DL (ref 31.5–36.5)
MCV RBC AUTO: 88 FL (ref 78–100)
PLATELET # BLD AUTO: 406 10E3/UL (ref 150–450)
POTASSIUM SERPL-SCNC: 3.7 MMOL/L (ref 3.4–5.3)
RBC # BLD AUTO: 4.23 10E6/UL (ref 3.8–5.2)
SODIUM SERPL-SCNC: 132 MMOL/L (ref 135–145)
WBC # BLD AUTO: 11.4 10E3/UL (ref 4–11)

## 2024-07-11 PROCEDURE — 80048 BASIC METABOLIC PNL TOTAL CA: CPT | Performed by: HOSPITALIST

## 2024-07-11 PROCEDURE — 99232 SBSQ HOSP IP/OBS MODERATE 35: CPT | Performed by: HOSPITALIST

## 2024-07-11 PROCEDURE — 36415 COLL VENOUS BLD VENIPUNCTURE: CPT | Performed by: HOSPITALIST

## 2024-07-11 PROCEDURE — 250N000012 HC RX MED GY IP 250 OP 636 PS 637: Performed by: STUDENT IN AN ORGANIZED HEALTH CARE EDUCATION/TRAINING PROGRAM

## 2024-07-11 PROCEDURE — 250N000013 HC RX MED GY IP 250 OP 250 PS 637: Performed by: INTERNAL MEDICINE

## 2024-07-11 PROCEDURE — 250N000011 HC RX IP 250 OP 636: Performed by: HOSPITALIST

## 2024-07-11 PROCEDURE — 86140 C-REACTIVE PROTEIN: CPT | Performed by: STUDENT IN AN ORGANIZED HEALTH CARE EDUCATION/TRAINING PROGRAM

## 2024-07-11 PROCEDURE — 85027 COMPLETE CBC AUTOMATED: CPT | Performed by: HOSPITALIST

## 2024-07-11 PROCEDURE — 83735 ASSAY OF MAGNESIUM: CPT | Performed by: HOSPITALIST

## 2024-07-11 PROCEDURE — 120N000001 HC R&B MED SURG/OB

## 2024-07-11 PROCEDURE — 97530 THERAPEUTIC ACTIVITIES: CPT | Mod: GO

## 2024-07-11 RX ORDER — CEFTRIAXONE 2 G/1
2 INJECTION, POWDER, FOR SOLUTION INTRAMUSCULAR; INTRAVENOUS EVERY 24 HOURS
Status: DISCONTINUED | OUTPATIENT
Start: 2024-07-11 | End: 2024-07-14

## 2024-07-11 RX ADMIN — ROSUVASTATIN CALCIUM 40 MG: 20 TABLET, FILM COATED ORAL at 21:46

## 2024-07-11 RX ADMIN — CEFTRIAXONE 2 G: 2 INJECTION, POWDER, FOR SOLUTION INTRAMUSCULAR; INTRAVENOUS at 20:38

## 2024-07-11 RX ADMIN — ASPIRIN 81 MG CHEWABLE TABLET 81 MG: 81 TABLET CHEWABLE at 20:43

## 2024-07-11 RX ADMIN — AZITHROMYCIN DIHYDRATE 250 MG: 250 TABLET ORAL at 07:59

## 2024-07-11 RX ADMIN — KETOTIFEN FUMARATE 1 DROP: 0.25 SOLUTION/ DROPS OPHTHALMIC at 08:09

## 2024-07-11 RX ADMIN — PANTOPRAZOLE SODIUM 40 MG: 40 TABLET, DELAYED RELEASE ORAL at 07:59

## 2024-07-11 RX ADMIN — PREDNISONE 60 MG: 20 TABLET ORAL at 07:58

## 2024-07-11 RX ADMIN — KETOTIFEN FUMARATE 1 DROP: 0.25 SOLUTION/ DROPS OPHTHALMIC at 20:46

## 2024-07-11 RX ADMIN — LEVOTHYROXINE SODIUM 75 MCG: 0.07 TABLET ORAL at 06:42

## 2024-07-11 RX ADMIN — CARVEDILOL 12.5 MG: 6.25 TABLET, FILM COATED ORAL at 07:59

## 2024-07-11 ASSESSMENT — ACTIVITIES OF DAILY LIVING (ADL)
ADLS_ACUITY_SCORE: 23

## 2024-07-11 NOTE — PROGRESS NOTES
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     #Acute hypoxemic respiratory failure secondary to suspected radiation pneumonitis versus community-acquired pneumonia. Immunosuppressed with malignancy and recent chemoradiation: Medical history notable for left lower lobe adenocarcinoma with bulky mediastinal lymphadenopathy and 2 brain metastases that were ultimately diagnosed in April 2024.  Ms. Hudson smoked for about 40 pack years and quit in about 2005 when she had a myocardial infarction.  Her RCA was stented at that time and other less obstructive disease was noted.  She also has CKD stage III, essential hypertension and dyslipidemia.  She notes that she received chemoradiation that ended in early June.  Since completion, she has had progressive shortness of breath particularly with exertion.  She lives alone in a single-family home.  She has had difficulty with day-to-day activity.  He denies chest pain.  No clear fevers.  She has been coughing without much production.  -In the ER, patient afebrile and normotensive.  Saturations in the high 80s on room air. CBC without leukocytosis.  BMP with creatinine close to baseline at 1.37.  Sodium mildly low 128, potassium 3.2.  Her BNP was elevated at 2200.  High-sensitivity troponin mildly elevated at 28 but stable on recheck.  -He had a CT PE that showed no PE but diffuse, groundglass changes in both lungs with patchy airspace changes throughout the left lung with areas of consolidation.  No change to known primary neoplasm.  -Patient was given 40 mg of IV Lasix and started on ceftriaxone and azithromycin.  -We will continue ceftriaxone  and azithromycin. Started on prednisone therapy on 7/10. Continue prednisone 60 mg daily.   -Holding IV Lasix for now.  Consider repeat dosing periodically for optimization but don't suspect volume overload is major player here.  -Pulmonary consulted.  Discussed with Dr. Connolly on 7/10.  Suspect radiation pneumonitis vs. Organizing pneumonia.  Infectious workup thus far not remarkable.  Procal not elevated.  Viral panel negative.  1/2 blood culture positive for coag negative staph (likely contaminant).  Checking fungal markers and sputum culture.    -Therapies consulted.  -Remains on 3-5L O2 on 7/11.  Suspect she will need home O2.  Will discuss with pulmonary again on Friday, 7/12 and possibly discharge with home O2 in next couple days if O2 needs are stable.      #Mild elevation of Hstroponin. Akinetic inferior and basal inferoseptal wall.  Hypertension, hyperlipidemia: Patient with mild elevation of her high-sensitivity troponin.  Stable on recheck.  Echocardiogram showed EF 50-55% with basal inferior, inferolateral and basal inferoseptal walls there were hypokinetic to akinetic.  It looks like this was noted on echocardiogram as well in 2022 in Care Everywhere.  -Continue on her home carvedilol, aspirin, statin.     #Lung cancer, with primary in the left lower lobe, assoc bulky mediastinal ELPIDIO and two brain tumors at the time of diagnosis: Followed through [facility].  Pt has been treated with carboplatin (reduced dose) and paclitaxel along with radiation therapy starting on 5/8 and with the last XRT on 6/19/2024.  Also was treated with stereotactic radiation to left frontal and temporal lobe lesions in April.  -Patient should follow-up with her outpatient oncologist.     #Hypokalemia: Replacement protocol  #Hyponatremia: Mild. Monitor.   #CKD3: Looks like baseline creatinine is 1.2-1.5.  Creatinine at baseline.  Monitor.     DVT Prophylaxis: Enoxaparin (Lovenox) SQ  Code Status: No CPR- Pre-arrest  intubation OK  Dispo: Likely 1-2 more days. Wean O2 as able.     Osman Mcnally MD    Interval History   No events. Still on 3-5L (more with activity).  Feels a bit better today. A bit more energy and has better appetite.  Worked with therapies.  No CP. No fevers/chills. No Ap, n/v.      -Data reviewed today: I reviewed all new labs and imaging results over the last 24 hours. I personally reviewed     Physical Exam   Temp: 98.2  F (36.8  C) Temp src: Oral BP: 131/66 Pulse: 67   Resp: 20 SpO2: 96 % O2 Device: Nasal cannula Oxygen Delivery: 4 LPM  Vitals:    07/09/24 1734   Weight: 69.4 kg (153 lb)     Vital Signs with Ranges  Temp:  [97.5  F (36.4  C)-98.3  F (36.8  C)] 98.2  F (36.8  C)  Pulse:  [67-88] 67  Resp:  [18-20] 20  BP: (107-131)/(66-76) 131/66  SpO2:  [91 %-96 %] 96 %  No intake/output data recorded.    Constitutional: Deconditioned.  No acute distress.  Answers appropriately.  HEENT: Normocephalic. MMM, No elevation of JVD noted.   Respiratory: Nl WOB, she has inspiratory crackles bilaterally, mild.  More coarse at the left base.  No wheeze.  Cardiovascular: Regular, no murmur  GI: BS+, NT, ND  Skin/Integumen: WWP, no rash.  Mild bilateral edema.  Neuro: CNII-XII intact. Moves all extremities. No tremor. A&Ox3.    Medications   Current Facility-Administered Medications   Medication Dose Route Frequency Provider Last Rate Last Admin     Current Facility-Administered Medications   Medication Dose Route Frequency Provider Last Rate Last Admin    aspirin (ASA) chewable tablet 81 mg  81 mg Oral QPM Guzman Alvarez MD   81 mg at 07/10/24 1942    azithromycin (ZITHROMAX) tablet 250 mg  250 mg Oral Daily Guzman Alvarez MD   250 mg at 07/11/24 0759    carvedilol (COREG) tablet 12.5 mg  12.5 mg Oral BID w/meals Guzman Alvarez MD   12.5 mg at 07/11/24 0759    cefTRIAXone (ROCEPHIN) 2 g vial to attach to  ml bag for ADULTS or NS 50 ml bag for PEDS  2 g Intravenous Q24H Osman Mcnally MD        enoxaparin  ANTICOAGULANT (LOVENOX) injection 40 mg  40 mg Subcutaneous Q24H Guzman Alvarez MD   40 mg at 07/09/24 2252    ketotifen fumarate 0.035% ophthalmic solution 1 drop  1 drop Both Eyes BID Guzman Alvarez MD   1 drop at 07/11/24 0809    levothyroxine (SYNTHROID/LEVOTHROID) tablet 75 mcg  75 mcg Oral QAM AC Guzman Alvarez MD   75 mcg at 07/11/24 0642    pantoprazole (PROTONIX) EC tablet 40 mg  40 mg Oral Daily Guzman Alvarez MD   40 mg at 07/11/24 0759    predniSONE (DELTASONE) tablet 60 mg  60 mg Oral Daily Martha Connolly MD   60 mg at 07/11/24 0758    rosuvastatin (CRESTOR) tablet 40 mg  40 mg Oral At Bedtime Guzman Alvarez MD   40 mg at 07/10/24 2108    sodium chloride (PF) 0.9% PF flush 3 mL  3 mL Intracatheter Q8H Guzman Alvarez MD   3 mL at 07/11/24 0642       Data   Recent Labs   Lab 07/11/24  0801 07/10/24  1737 07/10/24  0837 07/09/24  1956 07/09/24  1744   WBC 11.4*  --  9.5  --  9.8   HGB 12.6  --  11.8  --  11.9   MCV 88  --  87  --  87     --  387  --  408   *  --  131*  --  128*   POTASSIUM 3.7 3.9 2.9*  --  3.2*   CHLORIDE 94*  --  92*  --  90*   CO2 23  --  25  --  25   BUN 17.9  --  19.2  --  22.3   CR 1.13*  --  1.39* 1.41* 1.37*   ANIONGAP 15  --  14  --  13   OMAR 9.0  --  8.2*  --  8.7*   *  --  99  --  104*   ALBUMIN  --   --  2.6*  --  2.8*   PROTTOTAL  --   --  6.2*  --  6.3*   BILITOTAL  --   --  0.3  --  0.6   ALKPHOS  --   --  73  --  78   ALT  --   --  45  --  52*   AST  --   --  83*  --  101*       No results found for this or any previous visit (from the past 24 hour(s)).

## 2024-07-11 NOTE — PLAN OF CARE
"End of Shift Summary  For vital signs and complete assessments, please see documentation flowsheets.     Pertinent assessments: A&O x4. Refusing bed alarm says that OT said she can be independent, did provide education on the risk of falling. Denies pain. Dyspnea w/ activity, o2 on 3L NC. Provided patient education on IS.     [place] [facility] [place] Plan: wean o2 as able, OT, IV abx     Bedside Nurse: Krystle Thomas RN       Problem: Adult Inpatient Plan of Care  Goal: Plan of Care Review  Description: The Plan of Care Review/Shift note should be completed every shift.  The Outcome Evaluation is a brief statement about your assessment that the patient is improving, declining, or no change.  This information will be displayed automatically on your shift  note.  Outcome: Progressing  Flowsheets (Taken 7/11/2024 1447)  Outcome Evaluation: 3 L NC. IS provided  Plan of Care Reviewed With: patient  Overall Patient Progress: improving  Goal: Patient-Specific Goal (Individualized)  Description: You can add care plan individualizations to a care plan. Examples of Individualization might be:  \"Parent requests to be called daily at 9am for status\", \"I have a hard time hearing out of my right ear\", or \"Do not touch me to wake me up as it startles  me\".  Outcome: Progressing  Goal: Absence of Hospital-Acquired Illness or Injury  Outcome: Progressing  Intervention: Identify and Manage Fall Risk  Recent Flowsheet Documentation  Taken 7/11/2024 1444 by Krystle Thomas RN  Safety Promotion/Fall Prevention:   mobility aid in reach   lighting adjusted  Intervention: Prevent Infection  Recent Flowsheet Documentation  Taken 7/11/2024 1444 by Krystle Thomas RN  Infection Prevention: cohorting utilized  Goal: Optimal Comfort and Wellbeing  Outcome: Progressing  Goal: Readiness for Transition of Care  Outcome: Progressing     "

## 2024-07-11 NOTE — PLAN OF CARE
"Goal Outcome Evaluation:      Plan of Care Reviewed With: patient      Pt. A&O, SBA with walker, gait belt, for mobility, ate 100% of her meal, denied pain, plan to go home tomorrow with oxygen.    /66 (BP Location: Right arm)   Pulse 67   Temp 98.2  F (36.8  C) (Oral)   Resp 20   Ht 1.626 m (5' 4\")   Wt 69.4 kg (153 lb)   SpO2 96%   BMI 26.26 kg/m     Problem: Adult Inpatient Plan of Care  Goal: Plan of Care Review  Description: The Plan of Care Review/Shift note should be completed every shift.  The Outcome Evaluation is a brief statement about your assessment that the patient is improving, declining, or no change.  This information will be displayed automatically on your shift  note.  Outcome: Not Progressing  Flowsheets (Taken 7/11/2024 1051)  Outcome Evaluation: Pt is 4 l oxygen, denied pain, on IV Rocephen  Plan of Care Reviewed With: patient  Goal: Patient-Specific Goal (Individualized)  Description: You can add care plan individualizations to a care plan. Examples of Individualization might be:  \"Parent requests to be called daily at 9am for status\", \"I have a hard time hearing out of my right ear\", or \"Do not touch me to wake me up as it startles  me\".  Outcome: Not Progressing  Goal: Absence of Hospital-Acquired Illness or Injury  Outcome: Not Progressing  Intervention: Identify and Manage Fall Risk  Recent Flowsheet Documentation  Taken 7/11/2024 1000 by Iman Man RN  Safety Promotion/Fall Prevention:   supervised activity   safety round/check completed  Intervention: Prevent Skin Injury  Recent Flowsheet Documentation  Taken 7/11/2024 1000 by Iman Man RN  Body Position: position changed independently  Intervention: Prevent Infection  Recent Flowsheet Documentation  Taken 7/11/2024 1000 by Iman Man RN  Infection Prevention:   hand hygiene promoted   single patient room provided  Goal: Optimal Comfort and Wellbeing  Outcome: Not Progressing  Goal: Readiness for " Transition of Care  Outcome: Not Progressing  Flowsheets (Taken 7/11/2024 1051)  Anticipated Changes Related to Illness: inability to care for self  Transportation Anticipated: family or friend will provide  Concerns to be Addressed:   coping/stress   decision making   medication  Barriers to Discharge: On 4 l oxygen,  Intervention: Mutually Develop Transition Plan  Recent Flowsheet Documentation  Taken 7/11/2024 1051 by Iman Man, RN  Anticipated Changes Related to Illness: inability to care for self  Transportation Anticipated: family or friend will provide  Concerns to be Addressed:   coping/stress   decision making   medication     Problem: Gas Exchange Impaired  Goal: Optimal Gas Exchange  Outcome: Not Progressing  Intervention: Optimize Oxygenation and Ventilation  Recent Flowsheet Documentation  Taken 7/11/2024 1000 by Iman Man, RN  Head of Bed (HOB) Positioning: HOB at 60-90 degrees       Outcome Evaluation: Pt is 4 l oxygen, denied pain, on IV Rocephen

## 2024-07-11 NOTE — PLAN OF CARE
"Goal Outcome Evaluation:      Plan of Care Reviewed With: patient    Overall Patient Progress: improvingOverall Patient Progress: improving    Outcome Evaluation: Pt on 4 L NC, BC processing  Pt is A & O x 4, up ind, denies pain, on 4 lpm nc 02, K & Mg replacement, Zithromax & Rocephin for pna, OT recs home with assist.    Problem: Adult Inpatient Plan of Care  Goal: Plan of Care Review  Description: The Plan of Care Review/Shift note should be completed every shift.  The Outcome Evaluation is a brief statement about your assessment that the patient is improving, declining, or no change.  This information will be displayed automatically on your shift  note.  Outcome: Progressing  Flowsheets (Taken 7/11/2024 0358)  Outcome Evaluation: Pt on 4 L NC, BC processing  Plan of Care Reviewed With: patient  Overall Patient Progress: improving  Goal: Patient-Specific Goal (Individualized)  Description: You can add care plan individualizations to a care plan. Examples of Individualization might be:  \"Parent requests to be called daily at 9am for status\", \"I have a hard time hearing out of my right ear\", or \"Do not touch me to wake me up as it startles  me\".  Outcome: Progressing  Goal: Absence of Hospital-Acquired Illness or Injury  Outcome: Progressing  Intervention: Identify and Manage Fall Risk  Recent Flowsheet Documentation  Taken 7/11/2024 0103 by Bird Ken RN  Safety Promotion/Fall Prevention:   supervised activity   safety round/check completed  Intervention: Prevent Skin Injury  Recent Flowsheet Documentation  Taken 7/11/2024 0103 by Bird Ken RN  Body Position: position changed independently  Intervention: Prevent Infection  Recent Flowsheet Documentation  Taken 7/11/2024 0103 by Bird Ken RN  Infection Prevention:   hand hygiene promoted   single patient room provided  Goal: Optimal Comfort and Wellbeing  Outcome: Progressing  Goal: Readiness for Transition of Care  Outcome: Progressing   "   Problem: Gas Exchange Impaired  Goal: Optimal Gas Exchange  Outcome: Progressing  Intervention: Optimize Oxygenation and Ventilation  Recent Flowsheet Documentation  Taken 7/11/2024 0103 by Bird Ken, RN  Head of Bed (HOB) Positioning: HOB at 60-90 degrees

## 2024-07-11 NOTE — PLAN OF CARE
"Goal Outcome Evaluation:      Plan of Care Reviewed With: patient    Overall Patient Progress: no changeOverall Patient Progress: no change    Outcome Evaluation: Still requiring 4 Lpm NC -dips down with activity.  Blood cultures positive for Staphylococcus Species    Pt is alert and orientated x4 pleasant. Independent in room. On 4 Lpm NC - dips down into 80's with activity. Tolerating regular diet.  K+ Mg+ protocols.  BP soft, held Coreg. Pt has mild edema BLE.  Lab called with critical lab at 1800 positive for Staphylococcus Species. Awaiting culture results from recent blood draw.  UA sent to lab as well.    Problem: Adult Inpatient Plan of Care  Goal: Plan of Care Review  Description: The Plan of Care Review/Shift note should be completed every shift.  The Outcome Evaluation is a brief statement about your assessment that the patient is improving, declining, or no change.  This information will be displayed automatically on your shift  note.  Outcome: Not Progressing  Flowsheets (Taken 7/10/2024 2132)  Outcome Evaluation: Still requiring 4 Lpm NC -dips down with activity.  Blood cultures positive for Staphylococcus Species  Plan of Care Reviewed With: patient  Overall Patient Progress: no change  Goal: Patient-Specific Goal (Individualized)  Description: You can add care plan individualizations to a care plan. Examples of Individualization might be:  \"Parent requests to be called daily at 9am for status\", \"I have a hard time hearing out of my right ear\", or \"Do not touch me to wake me up as it startles  me\".  Outcome: Not Progressing  Goal: Absence of Hospital-Acquired Illness or Injury  Outcome: Not Progressing  Intervention: Identify and Manage Fall Risk  Recent Flowsheet Documentation  Taken 7/10/2024 1554 by Param Kerns, RN  Safety Promotion/Fall Prevention:   supervised activity   safety round/check completed  Intervention: Prevent Skin Injury  Recent Flowsheet Documentation  Taken 7/10/2024 1554 by " Param Kerns, RN  Body Position: position changed independently  Intervention: Prevent Infection  Recent Flowsheet Documentation  Taken 7/10/2024 1554 by Param Kerns, RN  Infection Prevention:   hand hygiene promoted   single patient room provided  Goal: Optimal Comfort and Wellbeing  Outcome: Not Progressing  Goal: Readiness for Transition of Care  Outcome: Not Progressing     Problem: Gas Exchange Impaired  Goal: Optimal Gas Exchange  Outcome: Not Progressing  Intervention: Optimize Oxygenation and Ventilation  Recent Flowsheet Documentation  Taken 7/10/2024 1554 by Param Kerns, RN  Head of Bed (HOB) Positioning: HOB at 60-90 degrees

## 2024-07-12 ENCOUNTER — APPOINTMENT (OUTPATIENT)
Dept: OCCUPATIONAL THERAPY | Facility: CLINIC | Age: 79
DRG: 205 | End: 2024-07-12
Payer: COMMERCIAL

## 2024-07-12 LAB
1,3 BETA GLUCAN SER-MCNC: 32 PG/ML
BACTERIA BLD CULT: ABNORMAL
BACTERIA BLD CULT: ABNORMAL
CRP SERPL-MCNC: 65.75 MG/L
ERYTHROCYTE [DISTWIDTH] IN BLOOD BY AUTOMATED COUNT: 18.1 % (ref 10–15)
GALACTOMANNAN AG SERPL QL IA: NEGATIVE
GALACTOMANNAN AG SPEC IA-ACNC: 0.03
H CAPSUL AG UR QL IA: NOT DETECTED
H CAPSUL AG UR-MCNC: NOT DETECTED NG/ML
HCT VFR BLD AUTO: 34.4 % (ref 35–47)
HGB BLD-MCNC: 11.4 G/DL (ref 11.7–15.7)
MAGNESIUM SERPL-MCNC: 2.3 MG/DL (ref 1.7–2.3)
MCH RBC QN AUTO: 29.6 PG (ref 26.5–33)
MCHC RBC AUTO-ENTMCNC: 33.1 G/DL (ref 31.5–36.5)
MCV RBC AUTO: 89 FL (ref 78–100)
OBSERVATION IMP: NEGATIVE
PLATELET # BLD AUTO: 376 10E3/UL (ref 150–450)
POTASSIUM SERPL-SCNC: 3.7 MMOL/L (ref 3.4–5.3)
RBC # BLD AUTO: 3.85 10E6/UL (ref 3.8–5.2)
WBC # BLD AUTO: 14.4 10E3/UL (ref 4–11)

## 2024-07-12 PROCEDURE — 97535 SELF CARE MNGMENT TRAINING: CPT | Mod: GO

## 2024-07-12 PROCEDURE — 36415 COLL VENOUS BLD VENIPUNCTURE: CPT | Performed by: STUDENT IN AN ORGANIZED HEALTH CARE EDUCATION/TRAINING PROGRAM

## 2024-07-12 PROCEDURE — 83735 ASSAY OF MAGNESIUM: CPT | Performed by: HOSPITALIST

## 2024-07-12 PROCEDURE — 86140 C-REACTIVE PROTEIN: CPT | Performed by: STUDENT IN AN ORGANIZED HEALTH CARE EDUCATION/TRAINING PROGRAM

## 2024-07-12 PROCEDURE — 120N000001 HC R&B MED SURG/OB

## 2024-07-12 PROCEDURE — 250N000013 HC RX MED GY IP 250 OP 250 PS 637: Performed by: INTERNAL MEDICINE

## 2024-07-12 PROCEDURE — 36415 COLL VENOUS BLD VENIPUNCTURE: CPT | Performed by: HOSPITALIST

## 2024-07-12 PROCEDURE — 85027 COMPLETE CBC AUTOMATED: CPT | Performed by: HOSPITALIST

## 2024-07-12 PROCEDURE — 250N000011 HC RX IP 250 OP 636: Performed by: HOSPITALIST

## 2024-07-12 PROCEDURE — 99232 SBSQ HOSP IP/OBS MODERATE 35: CPT | Performed by: STUDENT IN AN ORGANIZED HEALTH CARE EDUCATION/TRAINING PROGRAM

## 2024-07-12 PROCEDURE — 97530 THERAPEUTIC ACTIVITIES: CPT | Mod: GO

## 2024-07-12 PROCEDURE — 250N000012 HC RX MED GY IP 250 OP 636 PS 637: Performed by: STUDENT IN AN ORGANIZED HEALTH CARE EDUCATION/TRAINING PROGRAM

## 2024-07-12 PROCEDURE — 250N000011 HC RX IP 250 OP 636: Performed by: INTERNAL MEDICINE

## 2024-07-12 PROCEDURE — 99232 SBSQ HOSP IP/OBS MODERATE 35: CPT | Performed by: HOSPITALIST

## 2024-07-12 PROCEDURE — 84132 ASSAY OF SERUM POTASSIUM: CPT | Performed by: HOSPITALIST

## 2024-07-12 RX ADMIN — AZITHROMYCIN DIHYDRATE 250 MG: 250 TABLET ORAL at 07:55

## 2024-07-12 RX ADMIN — KETOTIFEN FUMARATE 1 DROP: 0.25 SOLUTION/ DROPS OPHTHALMIC at 07:56

## 2024-07-12 RX ADMIN — CARVEDILOL 12.5 MG: 6.25 TABLET, FILM COATED ORAL at 18:15

## 2024-07-12 RX ADMIN — PANTOPRAZOLE SODIUM 40 MG: 40 TABLET, DELAYED RELEASE ORAL at 07:55

## 2024-07-12 RX ADMIN — CARVEDILOL 12.5 MG: 6.25 TABLET, FILM COATED ORAL at 07:55

## 2024-07-12 RX ADMIN — ENOXAPARIN SODIUM 40 MG: 40 INJECTION SUBCUTANEOUS at 21:33

## 2024-07-12 RX ADMIN — CEFTRIAXONE 2 G: 2 INJECTION, POWDER, FOR SOLUTION INTRAMUSCULAR; INTRAVENOUS at 20:27

## 2024-07-12 RX ADMIN — LEVOTHYROXINE SODIUM 75 MCG: 0.07 TABLET ORAL at 07:55

## 2024-07-12 RX ADMIN — ROSUVASTATIN CALCIUM 40 MG: 20 TABLET, FILM COATED ORAL at 21:32

## 2024-07-12 RX ADMIN — ACETAMINOPHEN 975 MG: 325 TABLET, FILM COATED ORAL at 05:03

## 2024-07-12 RX ADMIN — ASPIRIN 81 MG CHEWABLE TABLET 81 MG: 81 TABLET CHEWABLE at 20:27

## 2024-07-12 RX ADMIN — KETOTIFEN FUMARATE 1 DROP: 0.25 SOLUTION/ DROPS OPHTHALMIC at 20:31

## 2024-07-12 RX ADMIN — PREDNISONE 60 MG: 20 TABLET ORAL at 07:55

## 2024-07-12 ASSESSMENT — ACTIVITIES OF DAILY LIVING (ADL)
ADLS_ACUITY_SCORE: 23

## 2024-07-12 NOTE — PROGRESS NOTES
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     #Acute hypoxemic respiratory failure secondary to suspected radiation pneumonitis versus community-acquired pneumonia. Immunosuppressed with malignancy and recent chemoradiation: Medical history notable for left lower lobe adenocarcinoma with bulky mediastinal lymphadenopathy and 2 brain metastases that were ultimately diagnosed in April 2024.  Ms. Hudson smoked for about 40 pack years and quit in about 2005 when she had a myocardial infarction.  Her RCA was stented at that time and other less obstructive disease was noted.  She also has CKD stage III, essential hypertension and dyslipidemia.  She notes that she received chemoradiation that ended in early June.  Since completion, she has had progressive shortness of breath particularly with exertion.  She lives alone in a single-family home.  She has had difficulty with day-to-day activity.  He denies chest pain.  No clear fevers.  She has been coughing without much production.  -In the ER, patient afebrile and normotensive.  Saturations in the high 80s on room air. CBC without leukocytosis.  BMP with creatinine close to baseline at 1.37.  Sodium mildly low 128, potassium 3.2.  Her BNP was elevated at 2200.  High-sensitivity troponin mildly elevated at 28 but stable on recheck.  -He had a CT PE that showed no PE but diffuse, groundglass changes in both lungs with patchy airspace changes throughout the left lung with areas of consolidation.  No change to known primary neoplasm.  -Patient was given 40 mg of IV Lasix and started on ceftriaxone and azithromycin.  -We will continue ceftriaxone  and azithromycin. Started on prednisone therapy on 7/10. Continue prednisone 60 mg daily.   -Holding IV Lasix for now.  Consider repeat dosing periodically for optimization but don't suspect volume overload is major player here.  -Pulmonary consulted.  Discussed with Dr. Connolly on 7/10.  Suspect radiation pneumonitis vs. Organizing pneumonia.  Infectious workup thus far not remarkable.  Procal not elevated.  Viral panel negative.  1/2 blood culture positive for staph hominis which is likely contaminant.  Checking fungal markers and sputum culture which are still in process.    -With likely significant prednisone dosing and longer taper, will need PPI and PJP ppx on discharge.   -Therapies consulted.  -O2 documented at 1L early this AM and yesterday evening.  She was up to 4L when I evaluated her this AM.  I moved her down to 2L and O2 sats remained at around 92%. Continue to wean as able.   -Will await pulmonary recs but if O2 needs stable, may be able to discharge on 7/13 with home O2.    -Appreciate pulmonary recs.      #Mild elevation of Hstroponin. Akinetic inferior and basal inferoseptal wall.  Hypertension, hyperlipidemia: Patient with mild elevation of her high-sensitivity troponin.  Stable on recheck.  Echocardiogram showed EF 50-55% with basal inferior, inferolateral and basal inferoseptal walls there were hypokinetic to akinetic.  It looks like this was noted on echocardiogram as well in 2022 in Care Everywhere.  -Continue on her home carvedilol, aspirin, statin.     #Lung cancer, with primary in the left lower lobe, assoc bulky mediastinal ELPIDIO and two brain tumors at the time of diagnosis: Followed through [facility].  Pt has been treated with carboplatin (reduced dose) and paclitaxel along with radiation therapy starting on 5/8 and with the last XRT on 6/19/2024.  Also was treated with stereotactic radiation to left frontal and temporal lobe lesions in April.  -Patient should follow-up with her  outpatient oncologist.    #Leukocytosis: Suspect secondary to steroids. No sign of worsening infection.     #Severe malnutrition in context of chronic illness: Nutrition consulted.     #Hypokalemia: Replacement protocol  #Hyponatremia: Mild. Monitor.   #CKD3: Looks like baseline creatinine is 1.2-1.5.  Creatinine at baseline.  Monitor.     DVT Prophylaxis: Enoxaparin (Lovenox) SQ  Code Status: No CPR- Pre-arrest intubation OK  Dispo: Weaning O2.  Possible in next 1-2 days. May need home O2. Did well with therapy.     Osman Mcnally MD    Interval History   Felt SOB with a bit of chest tightness when she got back from bathroom last night.  No AP, n/v.  Appetite is improved.     -Data reviewed today: I reviewed all new labs and imaging results over the last 24 hours. I personally reviewed   Physical Exam   Temp: 97.4  F (36.3  C) Temp src: Skin BP: 133/71 Pulse: 72   Resp: 18 SpO2: 91 % O2 Device: Nasal cannula Oxygen Delivery: 1 LPM  Vitals:    07/09/24 1734   Weight: 69.4 kg (153 lb)     Vital Signs with Ranges  Temp:  [97.4  F (36.3  C)-97.5  F (36.4  C)] 97.4  F (36.3  C)  Pulse:  [69-77] 72  Resp:  [16-18] 18  BP: (111-133)/(59-71) 133/71  SpO2:  [91 %-94 %] 91 %  I/O last 3 completed shifts:  In: 240 [P.O.:240]  Out: -     Constitutional: Deconditioned.  No acute distress.  Answers appropriately.  HEENT: Normocephalic. MMM, No elevation of JVD noted.   Respiratory: Nl WOB, diminished but moving air bilateral.  Some inspiratory crackles noted bilaterally. No wheeze.   Cardiovascular: Regular, no murmur  GI: BS+, NT, ND  Skin/Integumen: WWP, no rash.  Mild bilateral edema.  Neuro: CNII-XII intact. Moves all extremities. No tremor. A&Ox3.       Medications   Current Facility-Administered Medications   Medication Dose Route Frequency Provider Last Rate Last Admin     Current Facility-Administered Medications   Medication Dose Route Frequency Provider Last Rate Last Admin    aspirin (ASA) chewable tablet 81 mg  81 mg  Oral QPM Guzman Alvarez MD   81 mg at 07/11/24 2043    azithromycin (ZITHROMAX) tablet 250 mg  250 mg Oral Daily Guzman Alvarez MD   250 mg at 07/12/24 0755    carvedilol (COREG) tablet 12.5 mg  12.5 mg Oral BID w/meals Guzman Alvarez MD   12.5 mg at 07/12/24 0755    cefTRIAXone (ROCEPHIN) 2 g vial to attach to  ml bag for ADULTS or NS 50 ml bag for PEDS  2 g Intravenous Q24H Osman Mcnally MD   2 g at 07/11/24 2038    enoxaparin ANTICOAGULANT (LOVENOX) injection 40 mg  40 mg Subcutaneous Q24H Guzman Alvarez MD   40 mg at 07/09/24 2252    ketotifen fumarate 0.035% ophthalmic solution 1 drop  1 drop Both Eyes BID Guzman Alvarez MD   1 drop at 07/12/24 0756    levothyroxine (SYNTHROID/LEVOTHROID) tablet 75 mcg  75 mcg Oral QAM AC Guzman Alvarez MD   75 mcg at 07/12/24 0755    pantoprazole (PROTONIX) EC tablet 40 mg  40 mg Oral Daily Guzman Alvarez MD   40 mg at 07/12/24 0755    predniSONE (DELTASONE) tablet 60 mg  60 mg Oral Daily Martha Connolly MD   60 mg at 07/12/24 0755    rosuvastatin (CRESTOR) tablet 40 mg  40 mg Oral At Bedtime Guzman Alvarez MD   40 mg at 07/11/24 2146    sodium chloride (PF) 0.9% PF flush 3 mL  3 mL Intracatheter Q8H Guzman Alvarez MD   3 mL at 07/12/24 0757       Data   Recent Labs   Lab 07/12/24  0655 07/11/24  0801 07/10/24  1737 07/10/24  0837 07/09/24  1956 07/09/24  1744   WBC 14.4* 11.4*  --  9.5  --  9.8   HGB 11.4* 12.6  --  11.8  --  11.9   MCV 89 88  --  87  --  87    406  --  387  --  408   NA  --  132*  --  131*  --  128*   POTASSIUM  --  3.7 3.9 2.9*  --  3.2*   CHLORIDE  --  94*  --  92*  --  90*   CO2  --  23  --  25  --  25   BUN  --  17.9  --  19.2  --  22.3   CR  --  1.13*  --  1.39* 1.41* 1.37*   ANIONGAP  --  15  --  14  --  13   OMAR  --  9.0  --  8.2*  --  8.7*   GLC  --  161*  --  99  --  104*   ALBUMIN  --   --   --  2.6*  --  2.8*   PROTTOTAL  --   --   --  6.2*  --  6.3*   BILITOTAL  --   --   --  0.3  --  0.6   ALKPHOS  --   --   --  73   --  78   ALT  --   --   --  45  --  52*   AST  --   --   --  83*  --  101*       No results found for this or any previous visit (from the past 24 hour(s)).

## 2024-07-12 NOTE — PLAN OF CARE
"End of Shift Summary  For vital signs and complete assessments, please see documentation flowsheets.     Pertinent assessments:Pt A&OX4, VSS except soft /59. On 2L 93%. Independent in the room, pt refusing bed alarm, provided education on the risk of falling. Scheduled rocephin given. PIV SL.     Major Shift Events: None     Treatment Plan: IV abx, wean O2.     Bedside Nurse: Theresa Neves RN     Problem: Adult Inpatient Plan of Care  Goal: Plan of Care Review  Description: The Plan of Care Review/Shift note should be completed every shift.  The Outcome Evaluation is a brief statement about your assessment that the patient is improving, declining, or no change.  This information will be displayed automatically on your shift  note.  Outcome: Not Progressing  Flowsheets (Taken 7/11/2024 2242)  Outcome Evaluation: wean to 2L O2.  Plan of Care Reviewed With: patient  Overall Patient Progress: no change  Goal: Patient-Specific Goal (Individualized)  Description: You can add care plan individualizations to a care plan. Examples of Individualization might be:  \"Parent requests to be called daily at 9am for status\", \"I have a hard time hearing out of my right ear\", or \"Do not touch me to wake me up as it startles  me\".  Outcome: Not Progressing  Goal: Absence of Hospital-Acquired Illness or Injury  Outcome: Not Progressing  Intervention: Identify and Manage Fall Risk  Recent Flowsheet Documentation  Taken 7/11/2024 2000 by Theresa Neves RN  Safety Promotion/Fall Prevention:   safety round/check completed   room organization consistent   room near nurse's station   patient and family education   nonskid shoes/slippers when out of bed   mobility aid in reach   lighting adjusted   increase visualization of patient   increased rounding and observation   clutter free environment maintained  Intervention: Prevent Skin Injury  Recent Flowsheet Documentation  Taken 7/11/2024 2000 by Theresa Neves RN  Body Position: " position changed independently  Intervention: Prevent Infection  Recent Flowsheet Documentation  Taken 7/11/2024 2000 by Theresa Neves RN  Infection Prevention: cohorting utilized  Goal: Optimal Comfort and Wellbeing  Outcome: Not Progressing  Goal: Readiness for Transition of Care  Outcome: Not Progressing     Problem: Gas Exchange Impaired  Goal: Optimal Gas Exchange  Outcome: Not Progressing  Intervention: Optimize Oxygenation and Ventilation  Recent Flowsheet Documentation  Taken 7/11/2024 2000 by Theresa Neves RN  Head of Bed (HOB) Positioning: HOB at 30-45 degrees   Goal Outcome Evaluation:      Plan of Care Reviewed With: patient    Overall Patient Progress: no changeOverall Patient Progress: no change    Outcome Evaluation: wean to 2L O2.

## 2024-07-12 NOTE — PLAN OF CARE
"Goal Outcome Evaluation:      Plan of Care Reviewed With: patient    Overall Patient Progress: improvingOverall Patient Progress: improving    Outcome Evaluation: Co/ SOB, PRN tylenol given with relief. reported having some loose stools. 2L/NC sats remain at 93-95%  To Do:  End of Shift Summary  For vital signs and complete assessments, please see documentation flowsheets.     Pertinent assessments: assumed cares 9434-4000. Pt A&OX4,  On 2l/NC. Sats @ 93-95%. VSS Afebrile. Independent in the room, pt refusing bed alarm, provided education on the risk of falling. Voiding ok without issues. Reported having some loose stools. PIV SL.     Major Shift Events: PRN Tylenol for pain    Treatment Plan: IV abx, wean O2.     Bedside Nurse: Peter Combs RN       Problem: Adult Inpatient Plan of Care  Goal: Plan of Care Review  Description: The Plan of Care Review/Shift note should be completed every shift.  The Outcome Evaluation is a brief statement about your assessment that the patient is improving, declining, or no change.  This information will be displayed automatically on your shift  note.  Outcome: Progressing  Flowsheets (Taken 7/12/2024 0706)  Outcome Evaluation: Co/ SOB, PRN tylenol given with relief. reported having some loose stools. 2L/NC  Plan of Care Reviewed With: patient  Overall Patient Progress: improving  Goal: Patient-Specific Goal (Individualized)  Description: You can add care plan individualizations to a care plan. Examples of Individualization might be:  \"Parent requests to be called daily at 9am for status\", \"I have a hard time hearing out of my right ear\", or \"Do not touch me to wake me up as it startles  me\".  Outcome: Progressing  Goal: Absence of Hospital-Acquired Illness or Injury  Outcome: Progressing  Intervention: Identify and Manage Fall Risk  Recent Flowsheet Documentation  Taken 7/11/2024 7363 by Peter Combs, RN  Safety Promotion/Fall Prevention:   activity supervised   assistive " device/personal items within reach  Intervention: Prevent Infection  Recent Flowsheet Documentation  Taken 7/11/2024 2346 by Peter Combs, RN  Infection Prevention:   cohorting utilized   rest/sleep promoted  Goal: Optimal Comfort and Wellbeing  Outcome: Progressing  Goal: Readiness for Transition of Care  Outcome: Progressing     Problem: Gas Exchange Impaired  Goal: Optimal Gas Exchange  Outcome: Progressing          Oral Minoxidil Counseling- I discussed with the patient the risks of oral minoxidil including but not limited to shortness of breath, swelling of the feet or ankles, dizziness, lightheadedness, unwanted hair growth and allergic reaction.  The patient verbalized understanding of the proper use and possible adverse effects of oral minoxidil.  All of the patient's questions and concerns were addressed.

## 2024-07-12 NOTE — PLAN OF CARE
"To Do:  End of Shift Summary  For vital signs and complete assessments, please see documentation flowsheets.     Pertinent assessments: Pt is A/Ox4. VSS on 2L NC. Denies pain and N/V. Dyspnea upon exertion. SBA. PIV SL. Regular diet. K and Mg protocol, AM draw.    Major Shift Events: blood cultures positive for staphylococcus hominis, MD aware but no orders obtained.     Treatment Plan: Rocephin. Jonatanthro. Wean O2.     Bedside Nurse: Taylor Waldron RN     Goal Outcome Evaluation:      Plan of Care Reviewed With: patient    Overall Patient Progress: improvingOverall Patient Progress: improving    Outcome Evaluation: 2L NC. Rocephin. Zithro.      Problem: Adult Inpatient Plan of Care  Goal: Plan of Care Review  Description: The Plan of Care Review/Shift note should be completed every shift.  The Outcome Evaluation is a brief statement about your assessment that the patient is improving, declining, or no change.  This information will be displayed automatically on your shift  note.  Outcome: Progressing  Flowsheets (Taken 7/12/2024 2366)  Outcome Evaluation: 2L NC. Rocephin. Zithro.  Plan of Care Reviewed With: patient  Overall Patient Progress: improving  Goal: Patient-Specific Goal (Individualized)  Description: You can add care plan individualizations to a care plan. Examples of Individualization might be:  \"Parent requests to be called daily at 9am for status\", \"I have a hard time hearing out of my right ear\", or \"Do not touch me to wake me up as it startles  me\".  Outcome: Progressing  Goal: Absence of Hospital-Acquired Illness or Injury  Outcome: Progressing  Intervention: Identify and Manage Fall Risk  Recent Flowsheet Documentation  Taken 7/12/2024 9323 by Taylor Waldron, RN  Safety Promotion/Fall Prevention:   activity supervised   clutter free environment maintained   nonskid shoes/slippers when out of bed   room near nurse's station   safety round/check completed  Intervention: Prevent Skin Injury  Recent " Flowsheet Documentation  Taken 7/12/2024 1240 by Taylor Waldron RN  Body Position:   position changed independently   sitting up in bed  Taken 7/12/2024 0755 by Taylor Waldron RN  Body Position:   position changed independently   sitting up in bed  Intervention: Prevent and Manage VTE (Venous Thromboembolism) Risk  Recent Flowsheet Documentation  Taken 7/12/2024 0755 by Taylor Waldron RN  VTE Prevention/Management: SCDs off (sequential compression devices)  Intervention: Prevent Infection  Recent Flowsheet Documentation  Taken 7/12/2024 0755 by Taylor Waldron RN  Infection Prevention:   cohorting utilized   single patient room provided   visitors restricted/screened  Goal: Optimal Comfort and Wellbeing  Outcome: Progressing  Goal: Readiness for Transition of Care  Outcome: Progressing     Problem: Gas Exchange Impaired  Goal: Optimal Gas Exchange  Outcome: Progressing

## 2024-07-12 NOTE — PROGRESS NOTES
[facility]    Pulmonary, Allergy, Critical Care and Sleep Medicine    Progress Note  07/12/2024    Dasia Hudson MRN# [id number]   Age: 78 year old YOB: 1945     Date of Admission: 7/9/2024     Assessment and Recommendations:    Dasia Hudsno is a 78 year old female with a history of lung adenocarcinoma metastatic to LNs and brain s/p chemoradiation and stereotactic brain radiation, CAD, CKD, HTN, hypothyroidism, who presented on 7/9/2024 with progressive dyspnea, found to  have new predominantly left lung infiltrates.    Acute Hypoxic Respiratory Failure  Bilateral Ground Glass  Metastatic Left Lung Adenocarcinoma  Progressive dyspnea and cough with left > right ground glass and especially consolidative changes on imaging. Differential of infectious vs inflammatory etiologies. Symptoms appear to have been gradually increasing in the days and weeks since completing chemoradiation and do believe clinical picture consistent with potential radiation pneumonitis. Risk factors of underlying lung disease, prior smoking history, concurrent chemoradiation with paclitaxel. Would also consider radiation associated organizing pneumonia given involvement of right lung. Less concern for infection, have checked fungal markers which still pending.     Improved symptoms and O2 needs on steroids. Typical treatment course would be 2-4 weeks on higher dose therapy followed by taper over 1-3 months. Would discharge on current dose alongside PJP prophylaxis. Is due to see Oncologist next week who can assess stability on steroids. Previously seen by Pulmonary through [facility] during lead up to cancer diagnosis and can follow up if needed for management of pneumonitis.     - Would discharge on prednisone 60 mg daily with 4 week supply, pending stability will need to be tapered outpatient by Oncology/Pulmonary  - Discharge on PJP prophylaxis, no allergies and can likely do single strength daily  Bactrim  - Will discharge on oxygen, reviewed today and recommended getting O2 sat probe (showed examples) with goal 89-94%  - Fungal markers pending  - Have requested CT imaging be pushed from [place] to [facility] so that can be reviewed by Cancer team  - Has follow up with [facility] Oncologist next week who can help patient get into Pulmonary clinic at [facility] if needed    Martha Connolly MD PhD  Pulmonary and Critical Care     Interval History:    Feeling better. Needing about 1L at rest and a couple L with movement. Is nervous about how to handle oxygen at home. Wants to get  back to her cancer treatments.     Review of Systems:  Complete 12 point ROS negative unless mentioned in HPI    Histories, Prior to Admission Medications, Allergies:    Medications:  Current Facility-Administered Medications   Medication Dose Route Frequency Provider Last Rate Last Admin    aspirin (ASA) chewable tablet 81 mg  81 mg Oral QPM Guzman Alvarez MD   81 mg at 07/11/24 2043    azithromycin (ZITHROMAX) tablet 250 mg  250 mg Oral Daily Guzman Alvarez MD   250 mg at 07/12/24 0755    carvedilol (COREG) tablet 12.5 mg  12.5 mg Oral BID w/meals Guzman Alvarez MD   12.5 mg at 07/12/24 0755    cefTRIAXone (ROCEPHIN) 2 g vial to attach to  ml bag for ADULTS or NS 50 ml bag for PEDS  2 g Intravenous Q24H Osman Mcnally MD   2 g at 07/11/24 2038    enoxaparin ANTICOAGULANT (LOVENOX) injection 40 mg  40 mg Subcutaneous Q24H Guzman Alvarez MD   40 mg at 07/09/24 2252    ketotifen fumarate 0.035% ophthalmic solution 1 drop  1 drop Both Eyes BID Guzman Alvarez MD   1 drop at 07/12/24 0756    levothyroxine (SYNTHROID/LEVOTHROID) tablet 75 mcg  75 mcg Oral QAM AC Guzman Alvarez MD   75 mcg at 07/12/24 0755    pantoprazole (PROTONIX) EC tablet 40 mg  40 mg Oral Daily Guzman Alvarez MD   40 mg at 07/12/24 0755    predniSONE (DELTASONE) tablet 60 mg  60 mg Oral Daily Martha Connolly MD   60 mg at 07/12/24  "0755    rosuvastatin (CRESTOR) tablet 40 mg  40 mg Oral At Bedtime Guzman Alvarez MD   40 mg at 07/11/24 2146    sodium chloride (PF) 0.9% PF flush 3 mL  3 mL Intracatheter Q8H Guzman Alvarez MD   3 mL at 07/12/24 0757     Current Facility-Administered Medications   Medication Dose Route Frequency Provider Last Rate Last Admin    acetaminophen (TYLENOL) tablet 975 mg  975 mg Oral Q8H PRN Guzman Alvarez MD   975 mg at 07/12/24 0503    calcium carbonate (TUMS) chewable tablet 1,000 mg  1,000 mg Oral 4x Daily PRN Guzman Alvarez MD        lidocaine (LMX4) cream   Topical Q1H PRN Guzman Alvarez MD        lidocaine 1 % 0.1-1 mL  0.1-1 mL Other Q1H PRN Guzman Alvarez MD        ondansetron (ZOFRAN ODT) ODT tab 4 mg  4 mg Oral Q6H PRN Guzman Alvarez MD        Or    ondansetron (ZOFRAN) injection 4 mg  4 mg Intravenous Q6H PRN Guzman Alvarez MD        senna-docusate (SENOKOT-S/PERICOLACE) 8.6-50 MG per tablet 1 tablet  1 tablet Oral BID PRN Guzman Alvarez MD        Or    senna-docusate (SENOKOT-S/PERICOLACE) 8.6-50 MG per tablet 2 tablet  2 tablet Oral BID PRN Guzman Alvarez MD        sodium chloride (PF) 0.9% PF flush 3 mL  3 mL Intracatheter q1 min prn Guzman Alvarez MD         Allergies:   No Known Allergies  Physical Exam:    /71 (BP Location: Right arm)   Pulse 72   Temp 97.4  F (36.3  C) (Skin)   Resp 18   Ht 1.626 m (5' 4\")   Wt 69.4 kg (153 lb)   SpO2 91%   BMI 26.26 kg/m      General: Sitting in bed in NAD  HEENT: anicteric, moist mucosa  Chest: Moving air, no crackles, no wheezing  Cardiac: RRR no murmurs  Abdomen: Soft, flat, non tender, active BS  Extremities: Trace LE Edema  Neuro: A&Ox3, no focal deficits   Skin: no rash noted    Laboratory, imaging, and microbiologic data:    All laboratory and imaging data reviewed, pertinent results discussed above.        "

## 2024-07-12 NOTE — CONSULTS
"CLINICAL NUTRITION SERVICES  -  ASSESSMENT NOTE      Recommendations:   - Continue diet as ordered.  Dasia is aware of ability to order small/frequent meals or protein snacks, we discussed high calorie/high protein tips and strategies for taste changes.   - Discussed availability of Ensure or Glucerna as she takes at home and how to order despite not being on room service menu.  - Offered high calorie/high protein recipes/tips and taste changes handouts but Dasia politely declined.  We reviewed tips verbally and also gave additional ideas for protein bars she can purchase at home.     MALNUTRITION:  % Weight Loss:  > 7.5% in 3 months (severe malnutrition)  % Intake:  </= 50% for >/= 5 days (severe malnutrition)  Subcutaneous Fat Loss:  Upper arm region mild to moderate depletion   Muscle Loss:  Temporal region mild depletion, Clavicle bone region mild to moderate depletion, and Acromion bone region mild to moderate depletion  Fluid Retention: None documented    Malnutrition Diagnosis: Severe malnutrition  In Context of:  Chronic illness or disease          REASON FOR ASSESSMENT  Dasia Hudson is a 78 year old female seen by Registered Dietitian for Malnutrition Screening Tool (MST).     PMH of: NSCL w/ brain mets (diagnosed in 4/2024).    Admit 2/2: Acute respiratory failure, PNA.      NUTRITION HISTORY  - Information obtained from patient and chart.  - Diet at home: Dasia reports she has been aiming for high protein, smaller/more frequent meals as able given she has been experiencing fatigue, low appetite, and taste changes (sensitive to sweet and salty) w/ her chemo.  She has also had radiation but denies reflux or other GI sx related to this.  - Usual intakes: Verbalizes trying to eat protein first.  Uses a protein drink in the AM (\"because I need it\") and then eats eggs, cheese, or small amounts of chicken dipped in sauce for flavor and calories.  - Barriers to PO intakes: Eating ~50% of usual or less for a period " "of weeks as a result.  - Use of oral supplements: Using Glucerna or Ensure (vanilla preference) as she likes the flavor of these supplements best.  Was thinking about buying a protein bar.  Has not met with an Oncology RD but was provided direction to use protein drinks by Oncology team per report.  - Allergies: NKFA.      CURRENT NUTRITION ORDERS  Diet Order:     Regular    Current Intake/Tolerance:  Limited timeframe of admission.  On 1 L ALEXANDRA Forman reports her appetite has been good so far during admission \"because of steroids\".  We discussed the availability of Ensure or Glucerna.      ANTHROPOMETRICS  Height: 5' 4\"  Weight: 153 lbs 0 oz  Body mass index is 26.26 kg/m .  Weight Status:  Overweight BMI 25-29.9  Weight History:  Wt Readings from Last 10 Encounters:   07/09/24 69.4 kg (153 lb)     - Wt of 155# from 6/12/2024.    - Wt of 164# from 5/04/2024.   - Wt of 166# from 4/10/2024 --> 8% wt loss in 3 months.  Confirmed w/ Dasia.  Usual body weight prior to loss was 160's.  - Wt of 164# from 1/17/2024.  - No edema documented.    LABS: Reviewed.    MEDICATIONS: Reviewed:  - Prednisone    GI: No BM acutely.    SKIN: No current documentation of PI.       ASSESSED NUTRITION NEEDS PER APPROVED PRACTICE GUIDELINES:    Dosing Weight 69 kg   Estimated Energy Needs: >/=30 Kcal/Kg  Justification: Hypermetabolic malignancy + mets, repletion   Estimated Protein Needs: 1.2-1.5 g pro/Kg  Justification: Repletion and preservation of lean body mass  Estimated Fluid Needs: per MD      NUTRITION DIAGNOSIS:  Malnutrition related to decreased oral intake w/ low appetite, fatigue, and taste changes in the setting of chemoradiation as evidenced by meeting <50% of nutrition needs for weeks to months per report, severe wt loss, fat/muscle loss.    NUTRITION INTERVENTIONS  Recommendations / Nutrition Prescription  See above.    Implementation  Nutrition education: Provided education on above.    Medical Food Supplement: As above. "     Collaboration and Referral of Nutrition care: Discussed POC with team during rounds.      Nutrition goals:  PO intakes of at least 50-75% of meals, snacks, or supplements >/=TID.       MONITORING AND EVALUATION:  Progress towards goals will be monitored and evaluated per protocol and Practice Guidelines          Brinda Zheng RDN, LD  Clinical Dietitian  3rd floor/ICU: [phone]  All other floors: [phone]  Weekend/holiday: [phone]  Office: [phone]

## 2024-07-13 ENCOUNTER — APPOINTMENT (OUTPATIENT)
Dept: PHYSICAL THERAPY | Facility: CLINIC | Age: 79
DRG: 205 | End: 2024-07-13
Attending: INTERNAL MEDICINE
Payer: COMMERCIAL

## 2024-07-13 ENCOUNTER — APPOINTMENT (OUTPATIENT)
Dept: OCCUPATIONAL THERAPY | Facility: CLINIC | Age: 79
DRG: 205 | End: 2024-07-13
Payer: COMMERCIAL

## 2024-07-13 LAB
ANION GAP SERPL CALCULATED.3IONS-SCNC: 12 MMOL/L (ref 7–15)
BUN SERPL-MCNC: 19.9 MG/DL (ref 8–23)
CALCIUM SERPL-MCNC: 8.7 MG/DL (ref 8.8–10.2)
CHLORIDE SERPL-SCNC: 96 MMOL/L (ref 98–107)
CREAT SERPL-MCNC: 1.1 MG/DL (ref 0.51–0.95)
DEPRECATED HCO3 PLAS-SCNC: 25 MMOL/L (ref 22–29)
EGFRCR SERPLBLD CKD-EPI 2021: 52 ML/MIN/1.73M2
ERYTHROCYTE [DISTWIDTH] IN BLOOD BY AUTOMATED COUNT: 18.3 % (ref 10–15)
GLUCOSE SERPL-MCNC: 102 MG/DL (ref 70–99)
HCT VFR BLD AUTO: 35.7 % (ref 35–47)
HGB BLD-MCNC: 12 G/DL (ref 11.7–15.7)
MAGNESIUM SERPL-MCNC: 2.3 MG/DL (ref 1.7–2.3)
MCH RBC QN AUTO: 29.9 PG (ref 26.5–33)
MCHC RBC AUTO-ENTMCNC: 33.6 G/DL (ref 31.5–36.5)
MCV RBC AUTO: 89 FL (ref 78–100)
PLATELET # BLD AUTO: 412 10E3/UL (ref 150–450)
POTASSIUM SERPL-SCNC: 3.7 MMOL/L (ref 3.4–5.3)
RBC # BLD AUTO: 4.01 10E6/UL (ref 3.8–5.2)
SODIUM SERPL-SCNC: 133 MMOL/L (ref 135–145)
WBC # BLD AUTO: 14.6 10E3/UL (ref 4–11)

## 2024-07-13 PROCEDURE — 99232 SBSQ HOSP IP/OBS MODERATE 35: CPT | Performed by: INTERNAL MEDICINE

## 2024-07-13 PROCEDURE — 97161 PT EVAL LOW COMPLEX 20 MIN: CPT | Mod: GP

## 2024-07-13 PROCEDURE — 97116 GAIT TRAINING THERAPY: CPT | Mod: GP

## 2024-07-13 PROCEDURE — 250N000011 HC RX IP 250 OP 636: Performed by: HOSPITALIST

## 2024-07-13 PROCEDURE — 250N000013 HC RX MED GY IP 250 OP 250 PS 637: Performed by: INTERNAL MEDICINE

## 2024-07-13 PROCEDURE — 97535 SELF CARE MNGMENT TRAINING: CPT | Mod: GO

## 2024-07-13 PROCEDURE — 250N000011 HC RX IP 250 OP 636: Performed by: INTERNAL MEDICINE

## 2024-07-13 PROCEDURE — 120N000001 HC R&B MED SURG/OB

## 2024-07-13 PROCEDURE — 97530 THERAPEUTIC ACTIVITIES: CPT | Mod: GP

## 2024-07-13 PROCEDURE — 250N000012 HC RX MED GY IP 250 OP 636 PS 637: Performed by: STUDENT IN AN ORGANIZED HEALTH CARE EDUCATION/TRAINING PROGRAM

## 2024-07-13 PROCEDURE — 80048 BASIC METABOLIC PNL TOTAL CA: CPT | Performed by: HOSPITALIST

## 2024-07-13 PROCEDURE — 36415 COLL VENOUS BLD VENIPUNCTURE: CPT | Performed by: HOSPITALIST

## 2024-07-13 PROCEDURE — 85027 COMPLETE CBC AUTOMATED: CPT | Performed by: HOSPITALIST

## 2024-07-13 PROCEDURE — 83735 ASSAY OF MAGNESIUM: CPT | Performed by: HOSPITALIST

## 2024-07-13 RX ADMIN — ASPIRIN 81 MG CHEWABLE TABLET 81 MG: 81 TABLET CHEWABLE at 21:13

## 2024-07-13 RX ADMIN — ROSUVASTATIN CALCIUM 40 MG: 20 TABLET, FILM COATED ORAL at 21:13

## 2024-07-13 RX ADMIN — LEVOTHYROXINE SODIUM 75 MCG: 0.07 TABLET ORAL at 08:14

## 2024-07-13 RX ADMIN — PREDNISONE 60 MG: 20 TABLET ORAL at 08:14

## 2024-07-13 RX ADMIN — CEFTRIAXONE 2 G: 2 INJECTION, POWDER, FOR SOLUTION INTRAMUSCULAR; INTRAVENOUS at 21:09

## 2024-07-13 RX ADMIN — CARVEDILOL 12.5 MG: 6.25 TABLET, FILM COATED ORAL at 08:14

## 2024-07-13 RX ADMIN — CARVEDILOL 12.5 MG: 6.25 TABLET, FILM COATED ORAL at 17:02

## 2024-07-13 RX ADMIN — KETOTIFEN FUMARATE 1 DROP: 0.25 SOLUTION/ DROPS OPHTHALMIC at 21:13

## 2024-07-13 RX ADMIN — AZITHROMYCIN DIHYDRATE 250 MG: 250 TABLET ORAL at 08:14

## 2024-07-13 RX ADMIN — PANTOPRAZOLE SODIUM 40 MG: 40 TABLET, DELAYED RELEASE ORAL at 08:14

## 2024-07-13 RX ADMIN — KETOTIFEN FUMARATE 1 DROP: 0.25 SOLUTION/ DROPS OPHTHALMIC at 08:16

## 2024-07-13 RX ADMIN — ENOXAPARIN SODIUM 40 MG: 40 INJECTION SUBCUTANEOUS at 21:13

## 2024-07-13 ASSESSMENT — ACTIVITIES OF DAILY LIVING (ADL)
DEPENDENT_IADLS:: INDEPENDENT
ADLS_ACUITY_SCORE: 23

## 2024-07-13 NOTE — PROGRESS NOTES
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  Ms. Hudson smoked for about 40 pack years and quit in about 2005 when she had a myocardial infarction.  Her RCA was stented at that time and other less obstructive disease was noted.  She also has CKD stage III, essential hypertension and dyslipidemia.  She notes that she received chemoradiation that ended in early June.  Since completion, she has had progressive shortness of breath particularly with exertion.  She lives alone in a single-family home.  She has had difficulty with day-to-day activity.  He denies chest pain.  No clear fevers.  She has been coughing without much production.  In the ER, patient afebrile and normotensive.  Saturations in the high 80s on room air. CBC without leukocytosis.  BMP with creatinine close to baseline at 1.37.  Sodium mildly low 128, potassium 3.2.  Her BNP was elevated at 2200.  High-sensitivity troponin mildly elevated at 28 but stable on recheck.  She had a CT PE that showed no PE but diffuse, groundglass changes in both lungs with patchy airspace changes throughout the left lung with areas of consolidation.  No change to known primary neoplasm.  Patient was given 40 mg of IV Lasix and started on ceftriaxone and  azithromycin.    -Pulmonary consulted.  Colleague discussed with Dr. Connolly on 7/10.  Suspect radiation pneumonitis vs. Organizing pneumonia, more likely felt radiation.  Infectious workup thus far not remarkable.  Procal not elevated.  Viral panel negative.  1/2 blood culture positive for staph hominis which is likely contaminant.  Checking fungal markers and sputum culture which are still in process.  Current recommended plan is discharge with prednisone 60 mg daily with slow outpatient taper TBD by follow-up providers.   Patient to also d/c on daily bactrim for PJP ppx.  -  Completed azithromycin reasonable course and will d/c.  Will continue ceftriaxone to complete 7 day course.  -  Patient with variable O2 sats, but usually has been needing 3-4 L at rest.   With activity though she has more marked hypoxia and needs more O2.  Initial O2 assessment today, could not achieve O2 sat in upper 80's at 6L or less.   This though was after a trial of room air sat testing which may have strained her.  I have asked her RN to let her rest a bit and repeat the activity O2 assessment.  If we cannot obtain reasonable O2 sats on 6L with activity, discharge home will be challenging with O2 needs.  Possible d/c tomorrow if continues improving and O2 needs improved/home O2 can be arranged.    Mild elevation of troponin, suspect Type II MI  Akinetic inferior and basal inferoseptal wall  Hypertension  hyperlipidemia:   Patient with mild elevation of her high-sensitivity troponin.  Stable on recheck.  Echocardiogram showed EF 50-55% with basal inferior, inferolateral and basal inferoseptal walls there were hypokinetic to akinetic.  It looks like this was noted on echocardiogram as well in 2022 in Care Everywhere.  -Continue on her home carvedilol, aspirin, statin.     Lung cancer, with primary in the left lower lobe, assoc bulky mediastinal ELPIDIO and two brain tumors at the time of diagnosis: Followed through [facility].  Pt has  "been treated with carboplatin (reduced dose) and paclitaxel along with radiation therapy starting on 5/8 and with the last XRT on 6/19/2024.  Also was treated with stereotactic radiation to left frontal and temporal lobe lesions in April.  -Patient should follow-up with her outpatient oncologist.     Leukocytosis: Suspect secondary to steroids. No sign of worsening infection.      Severe malnutrition in context of chronic illness: Nutrition consulted.      Hypokalemia: Replacement protocol    Hyponatremia: Mild. Monitor.     CKD3: Looks like baseline creatinine is 1.2-1.5.  Creatinine at baseline.  Monitor.    PPE Used:  Mask          Diet: Combination Diet Regular Diet Adult  Snacks/Supplements Adult: Other; Ok for patient to order any oral supplements prn (takes at home); With Meals    DVT Prophylaxis: Enoxaparin (Lovenox) SQ  Ling Catheter: Not present  Lines: None     Cardiac Monitoring: None  Code Status: No CPR- Pre-arrest intubation OK      Clinically Significant Risk Factors              # Hypoalbuminemia: Lowest albumin = 2.6 g/dL at 7/10/2024  8:37 AM, will monitor as appropriate      # Chronic heart failure with preserved ejection fraction: heart failure noted on problem list and last echo with EF >50%             # Overweight: Estimated body mass index is 26.26 kg/m  as calculated from the following:    Height as of this encounter: 1.626 m (5' 4\").    Weight as of this encounter: 69.4 kg (153 lb)., PRESENT ON ADMISSION  # Severe Malnutrition: based on nutrition assessment, PRESENT ON ADMISSION   # Financial/Environmental Concerns: none         Disposition Plan     Medically Ready for Discharge: Anticipated Tomorrow             John Joyner DO  Hospitalist Service  [facility]  Securely message with Catalina (more info)  Text page via [facility] Paging/Directory   ______________________________________________________________________    Interval History   Assumed care, history " reviewed.  Ms. Hudson is steadily feeling better.  She gets SOB with activity though feels a lot better at rest.    Physical Exam   Vital Signs: Temp: 97.6  F (36.4  C) Temp src: Oral BP: 123/75 Pulse: 76   Resp: 18 SpO2: 93 % O2 Device: Nasal cannula Oxygen Delivery: 4 LPM  Weight: 153 lbs 0 oz    GEN:  Alert, oriented x 3, appears comfortable, no overt distress.  HEENT:  Normocephalic/atraumatic, no scleral icterus, no nasal discharge, mouth moist.  CV:  Regular rate and rhythm, no murmur or JVD.  S1 + S2 noted, no S3 or S4.  LUNGS:  Slightly coarse but moving air well, no wheezing/retractions.  Symmetric chest rise on inhalation noted.  ABD:  Active bowel sounds, soft, non-tender/non-distended.  No guarding/rigidity.  EXT:  Trace edema.  No cyanosis.  No new joint synovitis noted.  SKIN:  Dry to touch, no new exanthems noted in the visualized areas.    Medical Decision Making       40 MINUTES SPENT BY ME on the date of service doing chart review, history, exam, documentation & further activities per the note.      Data   Medications   Current Facility-Administered Medications   Medication Dose Route Frequency Provider Last Rate Last Admin     Current Facility-Administered Medications   Medication Dose Route Frequency Provider Last Rate Last Admin    aspirin (ASA) chewable tablet 81 mg  81 mg Oral QPM Guzman Alvarez MD   81 mg at 07/12/24 2027    carvedilol (COREG) tablet 12.5 mg  12.5 mg Oral BID w/meals Guzman Alvarez MD   12.5 mg at 07/13/24 0814    cefTRIAXone (ROCEPHIN) 2 g vial to attach to  ml bag for ADULTS or NS 50 ml bag for PEDS  2 g Intravenous Q24H Osman Mcnally MD   2 g at 07/12/24 2027    enoxaparin ANTICOAGULANT (LOVENOX) injection 40 mg  40 mg Subcutaneous Q24H Guzman Alvarez MD   40 mg at 07/12/24 2133    ketotifen fumarate 0.035% ophthalmic solution 1 drop  1 drop Both Eyes BID Guzman Alvarez MD   1 drop at 07/13/24 0816    levothyroxine (SYNTHROID/LEVOTHROID) tablet 75 mcg  75 mcg Oral  QAM AC Guzman Alvarez MD   75 mcg at 07/13/24 0814    pantoprazole (PROTONIX) EC tablet 40 mg  40 mg Oral Daily Guzman Alvarez MD   40 mg at 07/13/24 0814    predniSONE (DELTASONE) tablet 60 mg  60 mg Oral Daily Martha Connolly MD   60 mg at 07/13/24 0814    rosuvastatin (CRESTOR) tablet 40 mg  40 mg Oral At Bedtime Guzman Alvarez MD   40 mg at 07/12/24 2132    sodium chloride (PF) 0.9% PF flush 3 mL  3 mL Intracatheter Q8H Guzman Alvarez MD   3 mL at 07/13/24 0816     Labs and Imaging results below reviewed today.    Recent Labs   Lab 07/13/24  0644 07/12/24  0655 07/11/24  0801   WBC 14.6* 14.4* 11.4*   HGB 12.0 11.4* 12.6   HCT 35.7 34.4* 37.2   MCV 89 89 88    376 406     7-Day Micro Results       Collected Updated Procedure Result Status      07/10/2024 1945 07/12/2024 1604 Histoplasma Galactomannan Antigen Quant by EIA, Urine [[id number]]   Urine, Clean Catch    Final result Component Value Units   Histoplasma Galactomannan Ag Quant, Urn Not Detected ng/mL   Histoplasma Galactomannan Ag Interp, Urn Not Detected    INTERPRETIVE DATA: Histoplasma Galactomannan Antigen                     Quantitative by EIA, Urine  Less than 0.4 ng/ml = Not Detected  0.4-0.7 ng/mL = Detected (below the limit of quantification)  0.8-24.0 ng/mL = Detected  Greater than 24.0 ng/mL = Detected (above the limit of   quantification)    The quantitative range of this assay is 0.8-24.0 ng/mL.   Antigen concentrations between 0.4-.07 or >24.0 ng/mL fall   outside the linear range of the assay and cannot be   accurately quantified.    This EIA test should be used in conjunction with other   diagnostic procedures, including microbiological culture,   histological examination of biopsy samples, and/or   radiographic evidence, to aid in the diagnosis of   histoplasmosis.    This test was developed and its performance characteristics   determined by [facility]. It has not been cleared or   approved by the U.S. Food  and Drug Administration. This   test was performed in a CLIA-certified laboratory and is   intended for clinical purposes.  Performed By: [id number]  [address] [place] [id number]  : Enrique Chavez MD, PhD  CLIA Number: [id number]            07/10/2024 1945 07/10/2024 2022 Blastomyces Agn Quant EIA Non Blood [[id number]]   Urine, Clean Catch    In process Component Value   No component results            07/10/2024 1920 07/12/2024 1931 Blood Culture Hand, Right [[id number]]   Blood from Hand, Right    Preliminary result Component Value   Culture No growth after 2 days  [P]                07/10/2024 1920 07/12/2024 1931 Blood Culture Hand, Left [[id number]]   Blood from Hand, Left    Preliminary result Component Value   Culture No growth after 2 days  [P]                07/10/2024 1737 07/12/2024 1345 Aspergillus Galactomannan Antigen [[id number]]   Blood from Hand, Right    Final result Component Value   Aspergillus Galactomannan Index 0.03   Aspergillus Galact AG Negative   INTERPRETIVE INFORMATION: Aspergillus Galactomannan Antigen   by EIA  Negative results do not exclude the diagnosis of invasive  aspergillosis. A single positive test result (index equal  to or greater than 0.5) should be clinically correlated  by testing a separate serum specimen because many agents  (e.g. foods, antibiotics) may cross-react with the test.  If invasive aspergillosis is suspected in high-risk  patients, serial sampling is recommended.  Performed By: [email or web address]  [address] [place] [id number]  : Enrique Chavez MD, PhD  CLIA Number: [id number]            07/10/2024 1737 07/12/2024 1538 1,3 Beta D glucan fungitell [[id number]]   Blood from Hand, Right    Final result Component Value Units   (1,3)-Beta-D-Glucan 32 pg/mL   B-D GLUCAN INTERPRETATION (1,3) Negative      The specimen submitted for (1,3)-Beta-D-Glucan (Fungitell)   testing  contained questionable amounts of interfering   substances which may alter the result. Re-collect if   clinically indicated.  INTERPRETIVE INFORMATION: (1,3)-beta-D-glucan (Fungitell)      Less than 31 pg/mL ................... Negative    31-59 pg/mL .......................... Negative    60-79 pg/mL .......................... Indeterminate    Greater than or equal to 80 pg/mL .... Positive    The Fungitell test is indicated for presumptive diagnosis   of fungal infection and should be used in conjunction with   other diagnostic procedures. This test does not detect   certain fungal species such as Cryptococcus, which produce   very low levels of (1,3)-beta-D-glucan. This test will not   detect the zygomycetes, such as Absidia, Mucor, and   Rhizopus, which are not known to produce   (1,3)-beta-D-glucan. In addition, the yeast phase of   Blastomyces dermatitidis produces little   (1,3)-beta-D-glucan and may not be detected by the assay.  Performed By: [facility]  [address] [place] [id number]  : Enrique Chavez MD, PhD  CLIA Number: [id number]            07/09/2024 2205 07/10/2024 0151 Respiratory Panel PCR [[id number]]    Swab from Nasopharyngeal    Final result Component Value   Adenovirus Not Detected   Coronavirus Not Detected   This test detects Coronavirus 229E, HKU1, NL63 and OC43 but does not distinguish between them. It does not detect MERS ( Respiratory Syndrome), SARS (Severe Acute Respiratory Syndrome) or 2019-nCoV (Novel 2019) Coronavirus.   Human Metapneumovirus Not Detected   Human Rhin/Enterovirus Not Detected   Influenza A Not Detected   Influenza A, H1 Not Detected   Influenza A 2009 H1N1 Not Detected   Influenza A, H3 Not Detected   Influenza B Not Detected   Parainfluenza Virus 1 Not Detected   Parainfluenza Virus 2 Not Detected   Parainfluenza Virus 3 Not Detected   Parainfluenza Virus 4 Not Detected   Respiratory Syncytial Virus A Not  "Detected   Respiratory Syncytial Virus B Not Detected   Chlamydia Pneumoniae Not Detected   Mycoplasma Pneumoniae Not Detected            07/09/2024 2045 07/12/2024 2246 Blood Culture Hand, Right [[id number]]   Blood from Hand, Right    Preliminary result Component Value   Culture No growth after 3 days  [P]                07/09/2024 2034 07/12/2024 0722 Blood Culture Peripheral Blood [[id number]]    (Abnormal)   Peripheral Blood    Final result Component Value   Culture Positive on the 1st day of incubation    Staphylococcus hominis    1 of 2 bottles        Susceptibility        Staphylococcus hominis      JUAN      Ciprofloxacin <=0.5 ug/mL Susceptible      Clindamycin 0.25 ug/mL Susceptible  [1]       Daptomycin 0.25 ug/mL Susceptible      Doxycycline 1 ug/mL Susceptible      Erythromycin >=8 ug/mL Resistant      Gentamicin <=0.5 ug/mL Susceptible      Levofloxacin <=0.12 ug/mL Susceptible      Oxacillin <=0.25 ug/mL Susceptible  [2]       Tetracycline <=1 ug/mL Susceptible      Vancomycin 1 ug/mL Susceptible                   [1]  This isolate DOES NOT demonstrate inducible clindamycin resistance in vitro. Clindamycin is susceptible and could be used when indicated, however, erythromycin is resistant and should not be used.     [2]  Oxacillin susceptible isolates are susceptible to cephalosporins (example: cefazolin and cephalexin) and beta lactam combination agents. Oxacillin resistant isolates are resistant to these agents.               Susceptibility Comments       Staphylococcus hominis    Antibiotics listed as \"No Interpretation\" have no regulatory guidelines for susceptibility/resistance available.               07/09/2024 2034 07/10/2024 1759 Speedmentigene GP Panel [[id number]]    (Abnormal)   Peripheral Blood    Final result Component Value   Staphylococcus species Detected   Positive for coagulase-negative Staphylococci, other than Staphylococcus epidermidis and Staphylococcus lugdunensis, by Speed[facility] " multiplex nucleic acid test. Coagulase-negative Staphylococci are the most common venipuncture or collection associated skin contaminants grown in blood cultures. Final identification and antimicrobial susceptibility testing will be verified by standard methods.   Staphylococcus aureus Not Detected   Staphylococcus epidermidis Not Detected   Staphylococcus lugdunensis Not Detected   Enterococcus faecalis Not Detected   Enterococcus faecium Not Detected   Streptococcus species Not Detected   Streptococcus agalactiae Not Detected   Streptococcus anginosus group Not Detected   Streptococcus pneumoniae Not Detected   Streptococcus pyogenes Not Detected   Listeria species Not Detected                  Recent Labs   Lab 07/13/24  0644 07/12/24  1400 07/12/24  0655 07/11/24  0801 07/10/24  1737 07/10/24  0837 07/09/24  1956 07/09/24  1744   *  --   --  132*  --  131*  --  128*   POTASSIUM 3.7 3.7  --  3.7   < > 2.9*  --  3.2*   CHLORIDE 96*  --   --  94*  --  92*  --  90*   CO2 25  --   --  23  --  25  --  25   ANIONGAP 12  --   --  15  --  14  --  13   *  --   --  161*  --  99  --  104*   BUN 19.9  --   --  17.9  --  19.2  --  22.3   CR 1.10*  --   --  1.13*  --  1.39*   < > 1.37*   GFRESTIMATED 52*  --   --  50*  --  39*   < > 40*   OMAR 8.7*  --   --  9.0  --  8.2*  --  8.7*   MAG 2.3  --  2.3 2.2  --   --    < >  --    PROTTOTAL  --   --   --   --   --  6.2*  --  6.3*   ALBUMIN  --   --   --   --   --  2.6*  --  2.8*   BILITOTAL  --   --   --   --   --  0.3  --  0.6   ALKPHOS  --   --   --   --   --  73  --  78   AST  --   --   --   --   --  83*  --  101*   ALT  --   --   --   --   --  45  --  52*    < > = values in this interval not displayed.     No results found for this or any previous visit (from the past 24 hour(s)).

## 2024-07-13 NOTE — PLAN OF CARE
"  Problem: Adult Inpatient Plan of Care  Goal: Plan of Care Review  Description: The Plan of Care Review/Shift note should be completed every shift.  The Outcome Evaluation is a brief statement about your assessment that the patient is improving, declining, or no change.  This information will be displayed automatically on your shift  note.  Outcome: Progressing  Goal: Patient-Specific Goal (Individualized)  Description: You can add care plan individualizations to a care plan. Examples of Individualization might be:  \"Parent requests to be called daily at 9am for status\", \"I have a hard time hearing out of my right ear\", or \"Do not touch me to wake me up as it startles  me\".  Outcome: Progressing  Goal: Absence of Hospital-Acquired Illness or Injury  Outcome: Progressing  Goal: Optimal Comfort and Wellbeing  Outcome: Progressing  Goal: Readiness for Transition of Care  Outcome: Progressing     Problem: Gas Exchange Impaired  Goal: Optimal Gas Exchange  Outcome: Progressing   Goal Outcome Evaluation:                        "

## 2024-07-13 NOTE — PROGRESS NOTES
Patient has been assessed for Home Oxygen needs. Oxygen readings:    *Pulse oximetry (SpO2) = 89% on room air at rest while awake.    *SpO2 improved to 92% on 3liters/minute at rest.    *SpO2 = 78% on room air during activity/with exercise.    *SpO2 improved to 85% on 6liters/minute during activity/with exercise.

## 2024-07-13 NOTE — PLAN OF CARE
"To Do:  End of Shift Summary  For vital signs and complete assessments, please see documentation flowsheets.     Pertinent assessments: Pt is A/Ox4. VSS on 3L NC. Denies pain and N/V. Dyspnea upon exertion. PIV SL. Regular diet. Pt c/o loose stools during this shift.     Major Shift Events: Home oxygen assessment completed. K and Mg protocols discontinued.     Treatment Plan: Rocephin. PT, OT, and pulmonary following. Wean O2.    Bedside Nurse: Taylor Waldron RN     Goal Outcome Evaluation:      Plan of Care Reviewed With: patient    Overall Patient Progress: improvingOverall Patient Progress: improving    Outcome Evaluation: 3L NC. Rocephin. Wean O2.      Problem: Adult Inpatient Plan of Care  Goal: Plan of Care Review  Description: The Plan of Care Review/Shift note should be completed every shift.  The Outcome Evaluation is a brief statement about your assessment that the patient is improving, declining, or no change.  This information will be displayed automatically on your shift  note.  Outcome: Progressing  Flowsheets (Taken 7/13/2024 9401)  Outcome Evaluation: 3L NC. Rocephin. Wean O2.  Plan of Care Reviewed With: patient  Overall Patient Progress: improving  Goal: Patient-Specific Goal (Individualized)  Description: You can add care plan individualizations to a care plan. Examples of Individualization might be:  \"Parent requests to be called daily at 9am for status\", \"I have a hard time hearing out of my right ear\", or \"Do not touch me to wake me up as it startles  me\".  Outcome: Progressing  Goal: Absence of Hospital-Acquired Illness or Injury  Outcome: Progressing  Intervention: Identify and Manage Fall Risk  Recent Flowsheet Documentation  Taken 7/13/2024 0810 by Taylor Waldron, RN  Safety Promotion/Fall Prevention:   activity supervised   clutter free environment maintained   nonskid shoes/slippers when out of bed   room near nurse's station   safety round/check completed  Intervention: Prevent Skin " Injury  Recent Flowsheet Documentation  Taken 7/13/2024 0804 by Taylor Waldron RN  Body Position: position changed independently  Intervention: Prevent and Manage VTE (Venous Thromboembolism) Risk  Recent Flowsheet Documentation  Taken 7/13/2024 0810 by Taylor Waldron RN  VTE Prevention/Management: SCDs off (sequential compression devices)  Intervention: Prevent Infection  Recent Flowsheet Documentation  Taken 7/13/2024 0810 by Taylor Waldron RN  Infection Prevention:   cohorting utilized   hand hygiene promoted   single patient room provided  Goal: Optimal Comfort and Wellbeing  Outcome: Progressing  Goal: Readiness for Transition of Care  Outcome: Progressing     Problem: Gas Exchange Impaired  Goal: Optimal Gas Exchange  Outcome: Progressing  Intervention: Optimize Oxygenation and Ventilation  Recent Flowsheet Documentation  Taken 7/13/2024 0804 by Taylor Waldron RN  Head of Bed (HOB) Positioning: HOB at 30-45 degrees

## 2024-07-13 NOTE — PROGRESS NOTES
"   07/13/24 1100   Appointment Info   Signing Clinician's Name / Credentials (PT) Claudia Mason DPT   Living Environment   People in Home alone   Current Living Arrangements house   Home Accessibility stairs to enter home;stairs within home   Number of Stairs, Main Entrance 2   Stair Railings, Main Entrance railings safe and in good condition   Number of Stairs, Within Home, Primary greater than 10 stairs   Stair Railings, Within Home, Primary railings safe and in good condition   Living Environment Comments All needs can be met on main level of home except for laundry in basement. Lives alone but reports having strong support from family who lives nearby.   Self-Care   Usual Activity Tolerance good   Current Activity Tolerance moderate   Equipment Currently Used at Home cane, straight   Fall history within last six months no   Activity/Exercise/Self-Care Comment Pt typically IND for mobility and ADLs. 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\"   Existing Precautions/Restrictions fall;oxygen therapy device and L/min  (6 lpm via NC, does not wear O2 at baseline)   Cognition   Affect/Mental Status (Cognition) WFL   Orientation Status (Cognition) oriented x 3   Follows Commands (Cognition) WFL   Pain Assessment   Patient Currently in Pain " No   Posture    Posture Forward head position   Range of Motion (ROM)   Range of Motion ROM is WFL   Strength (Manual Muscle Testing)   Strength (Manual Muscle Testing) Deficits observed during functional mobility   Strength Comments decreased functional endurance   Bed Mobility   Comment, (Bed Mobility) Satya sup>sit   Transfers   Comment, (Transfers) SBA sit>stand no AD   Gait/Stairs (Locomotion)   Comment, (Gait/Stairs) CGA for amb with no AD, mildly unsteady, reaching out for rails in argu[place] for support   Balance   Balance Comments mildly unsteady for gait with no AD, benefits from UE support for stability with gait   Sensory Examination   Sensory Perception Comments 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, also reports in L hand sometimes   Clinical Impression   Criteria for Skilled Therapeutic Intervention Yes, treatment indicated   PT Diagnosis (PT) impaired IND with functional mobility   Influenced by the following impairments impaired functional strength, balance, activity tolerance   Functional limitations due to impairments impaired bed mobility, transfers, ambulation   Clinical Presentation (PT Evaluation Complexity) stable   Clinical Presentation Rationale Based on current presentation, PMH, social support   Clinical Decision Making (Complexity) low complexity   Planned Therapy Interventions (PT) balance training;bed mobility training;gait training;home exercise program;patient/family education;stair training;strengthening;transfer training;progressive activity/exercise;home program guidelines   Risk & Benefits of therapy have been explained evaluation/treatment results reviewed;care plan/treatment goals reviewed;risks/benefits reviewed;current/potential barriers reviewed;participants voiced agreement with care plan;participants included;patient   PT Total Evaluation Time   PT Eval, Low Complexity Minutes (78598) 10   Physical Therapy Goals   PT Frequency Daily   PT Predicted Duration/Target Date for Goal Attainment 07/20/24    PT Goals Bed Mobility;Transfers;Gait;Aerobic Activity   PT: Bed Mobility Modified independent;Supine to/from sit   PT: Transfers Modified independent;Sit to/from stand;Bed to/from chair;Assistive device   PT: Gait Modified independent;Assistive device;Rolling walker;100 feet   PT: Perform aerobic activity with stable cardiovascular response intermittent activity;10 minutes;ambulation   Interventions   Interventions Quick Adds Therapeutic Activity;Gait Training   Therapeutic Activity   Therapeutic Activities: dynamic activities to improve functional performance Minutes (53903) 12   Symptoms Noted During/After Treatment Fatigue;Shortness of breath   Treatment Detail/Skilled Intervention Pt greeted resting on 6 lpm via NC, remained on 6 lpm throughout entire session. Dons and doffs slip on shoes with SBA. Sit<>Stands following eval SBA. When using cane for transfers, cues needed for safe cane use when transferring. Max desat 87% at end of session, recovers with seated rest. Discussed benefits of different ADs, pacing of activity, taking frequent short walks vs longer ones. Marnie for sit>sup. Remained supine with alarm armed and needs in reach.   Gait Training   Gait Training Minutes (98021) 10   Symptoms Noted During/After Treatment (Gait Training) fatigue;shortness of breath   Treatment Detail/Skilled Intervention Pt cued for gait, first bout with no AD. CGA-close SBA. Pt mildly unsteady, reaching out for [place] rails. Takes standing rest break for recovery, cued for PLB. Second bout of gait with SEC, CGA-SBA. cued for apporpriate cane sequencing. Seated rest break taken on chair in argu[place] due to fatigue.   Distance in Feet 70 + 60 + 10'   PT Discharge Planning   PT Plan bring 4WW for gait, monitor O2 sats, progress IND with O2 management   PT Discharge Recommendation (DC Rec) home with assist;home with home care physical therapy   PT Rationale for DC Rec Pt currently below reported IND baseline, currently CGA-SBA for  gait. Requires 6 lpm of O2 via NC, desats to 87% with mobility today. Rec home with assist for heavier household tasks, HHPT to address continued mobility deficits. May benefit from use of 4WW given activity tolerance and balance impairments.   PT Brief overview of current status Ax1, 6 L O2   Total Session Time   Timed Code Treatment Minutes 22   Total Session Time (sum of timed and untimed services) 32

## 2024-07-13 NOTE — CONSULTS
Care Management Initial Consult    General Information  Assessment completed with: Dasia Singer  Type of CM/SW Visit: Initial Assessment    Primary Care Provider verified and updated as needed: Yes   Readmission within the last 30 days:        Reason for Consult: discharge planning  Advance Care Planning: Advance Care Planning Reviewed: no concerns identified          Communication Assessment  Patient's communication style: spoken language (English or Bilingual)    Hearing Difficulty or Deaf: no   Wear Glasses or Blind: yes    Cognitive  Cognitive/Neuro/Behavioral: WDL  Level of Consciousness: alert  Arousal Level: opens eyes spontaneously  Orientation: oriented x 4  Mood/Behavior: calm, cooperative  Best Language: 0 - No aphasia  Speech: clear, spontaneous    Living Environment:   People in home: alone     Current living Arrangements: house - 3 stairs to enter, 10 stairs to lower level with laundry   Able to return to prior arrangements: yes       Family/Social Support:  Care provided by: self  Provides care for: no one  Marital Status:   Children, Friend, Sibling(s) (Grandchildren)          Description of Support System: Supportive, Involved    Support Assessment: Adequate family and caregiver support, Adequate social supports    Current Resources:   Patient receiving home care services: No     Community Resources: None  Equipment currently used at home: cane, straight  Supplies currently used at home: None    Employment/Financial:  Employment Status: retired        Financial Concerns: none   Referral to Financial Worker: Yes       Does the patient's insurance plan have a 3 day qualifying hospital stay waiver?  No    Lifestyle & Psychosocial Needs:  Social Determinants of Health     Food Insecurity: Not on file   Depression: Not at risk (11/28/2023)    Received from [employer]    PHQ-2     PHQ-2 Score: 0   Housing Stability: Not on file   Tobacco Use: Medium Risk (4/11/2024)    Received from  [facility]    Patient History     Smoking Tobacco Use: Former     Smokeless Tobacco Use: Never     Passive Exposure: Not on file   Financial Resource Strain: Not on file   Alcohol Use: Not on file   Transportation Needs: Not on file   Physical Activity: Not on file   Interpersonal Safety: Not on file   Stress: Not on file   Social Connections: Not on file   Health Literacy: Not on file       Functional Status:  Prior to admission patient needed assistance:   Dependent ADLs:: Independent  Dependent IADLs:: Independent       Mental Health Status:  Mental Health Status: No Current Concerns       Chemical Dependency Status:  Chemical Dependency Status: No Current Concerns             Values/Beliefs:  Spiritual, Cultural Beliefs, Mormon Practices, Values that affect care:            Values/Beliefs Comment: Alevism    Additional Information:  Sw met with the pt to discuss discharge planning.  Sw was sitting in her chair reading when Sw arrived.  Pt was in a pleasant mood and contributed appropriately to the conversation.    Pt states that she lives independently.  She just got a cane that she was going to start using.  She reports that over the last couple of weeks, she has been more short of breath.  One of her concerns is the unknown of everything.  She has a good support system and said that she has been trying to stay positive.  She has been independent with ADLS/IADLs, but since she has become more short of breath, she has needed more assistance.  Her family has been assisting her with transportation needs.  She has her groceries delivered.  Pt is on RA at baseline, but is now requiring O2.  Bernarda addressed the HC recommendation for RN/PT services.  Pt is agreeable to this plan.  She requested that referrals be sent to [facility], [facility], [facility], and [facility] Hc agencies.  Bernarda had provided her with the ratings from the Medicare website.    Bernarda and the pt discussed w/c options for her and ways to get  them.  The w/c would be for when she is outside of the house to get to appointments or the grocery store.  She also asked about a walker with a seat.  Multiple options were discussed and she is going to talk to her Oncologist on Thursday since she has an appointment then.  Pt's dtr lives nearby as well as a grandson, sister, nieces, and nephews that are all willing to help her out as needed.  She had questions regarding oxygen needs at discharge and how that process happens.  Sw addressed the questions and showed her what the oxygen concentrator will look like.  Pt stated that having answers to questions about discharging has been very helpful to ease her mind.  She has been able to get several questions answered by the interdisciplinary teams.  Sw sent the HC referrals via the HC hub.  Pt reports that her family will provide transportation home for her.    Sw will continue with discharge planning and will be available as needed until discharge.    ARLENE Ashton, [facility]  Inpatient Care Coordination  [facility]  [phone]

## 2024-07-14 ENCOUNTER — APPOINTMENT (OUTPATIENT)
Dept: OCCUPATIONAL THERAPY | Facility: CLINIC | Age: 79
DRG: 205 | End: 2024-07-14
Payer: COMMERCIAL

## 2024-07-14 ENCOUNTER — APPOINTMENT (OUTPATIENT)
Dept: PHYSICAL THERAPY | Facility: CLINIC | Age: 79
DRG: 205 | End: 2024-07-14
Payer: COMMERCIAL

## 2024-07-14 LAB — BACTERIA BLD CULT: NO GROWTH

## 2024-07-14 PROCEDURE — 97535 SELF CARE MNGMENT TRAINING: CPT | Mod: GO

## 2024-07-14 PROCEDURE — 250N000013 HC RX MED GY IP 250 OP 250 PS 637: Performed by: INTERNAL MEDICINE

## 2024-07-14 PROCEDURE — 250N000012 HC RX MED GY IP 250 OP 636 PS 637: Performed by: STUDENT IN AN ORGANIZED HEALTH CARE EDUCATION/TRAINING PROGRAM

## 2024-07-14 PROCEDURE — 120N000001 HC R&B MED SURG/OB

## 2024-07-14 PROCEDURE — 97116 GAIT TRAINING THERAPY: CPT | Mod: GP

## 2024-07-14 PROCEDURE — 97530 THERAPEUTIC ACTIVITIES: CPT | Mod: GP

## 2024-07-14 PROCEDURE — 250N000011 HC RX IP 250 OP 636: Performed by: INTERNAL MEDICINE

## 2024-07-14 PROCEDURE — 99232 SBSQ HOSP IP/OBS MODERATE 35: CPT | Performed by: INTERNAL MEDICINE

## 2024-07-14 RX ORDER — CEFDINIR 300 MG/1
300 CAPSULE ORAL EVERY 12 HOURS SCHEDULED
Status: COMPLETED | OUTPATIENT
Start: 2024-07-14 | End: 2024-07-15

## 2024-07-14 RX ORDER — SULFAMETHOXAZOLE AND TRIMETHOPRIM 400; 80 MG/1; MG/1
1 TABLET ORAL DAILY
Status: DISCONTINUED | OUTPATIENT
Start: 2024-07-16 | End: 2024-07-17 | Stop reason: HOSPADM

## 2024-07-14 RX ADMIN — PREDNISONE 60 MG: 20 TABLET ORAL at 08:17

## 2024-07-14 RX ADMIN — CARVEDILOL 12.5 MG: 6.25 TABLET, FILM COATED ORAL at 08:17

## 2024-07-14 RX ADMIN — KETOTIFEN FUMARATE 1 DROP: 0.25 SOLUTION/ DROPS OPHTHALMIC at 08:18

## 2024-07-14 RX ADMIN — ENOXAPARIN SODIUM 40 MG: 40 INJECTION SUBCUTANEOUS at 21:00

## 2024-07-14 RX ADMIN — PANTOPRAZOLE SODIUM 40 MG: 40 TABLET, DELAYED RELEASE ORAL at 08:17

## 2024-07-14 RX ADMIN — CEFDINIR 300 MG: 300 CAPSULE ORAL at 20:54

## 2024-07-14 RX ADMIN — LEVOTHYROXINE SODIUM 75 MCG: 0.07 TABLET ORAL at 06:54

## 2024-07-14 RX ADMIN — ROSUVASTATIN CALCIUM 40 MG: 20 TABLET, FILM COATED ORAL at 21:00

## 2024-07-14 RX ADMIN — KETOTIFEN FUMARATE 1 DROP: 0.25 SOLUTION/ DROPS OPHTHALMIC at 20:54

## 2024-07-14 RX ADMIN — ASPIRIN 81 MG CHEWABLE TABLET 81 MG: 81 TABLET CHEWABLE at 20:54

## 2024-07-14 RX ADMIN — CARVEDILOL 12.5 MG: 6.25 TABLET, FILM COATED ORAL at 18:05

## 2024-07-14 ASSESSMENT — ACTIVITIES OF DAILY LIVING (ADL)
ADLS_ACUITY_SCORE: 23
ADLS_ACUITY_SCORE: 25
ADLS_ACUITY_SCORE: 23

## 2024-07-14 NOTE — PROGRESS NOTES
Care Management Follow Up    Length of Stay (days): 5    Expected Discharge Date: 07/15/2024     Concerns to be Addressed: discharge planning     Patient plan of care discussed at interdisciplinary rounds: Yes    Anticipated Discharge Disposition: Home, Home Care     Anticipated Discharge Services: Home Care  Anticipated Discharge DME: Oxygen    Patient/family educated on Medicare website which has current facility and service quality ratings: yes  Education Provided on the Discharge Plan: Yes  Patient/Family in Agreement with the Plan: yes    Referrals Placed by CM/SW: Homecare  Private pay costs discussed: Not applicable    Additional Information:  Bernarda met with the pt to update her [place] [facility] [place]PT [place].  It has been requested for Sw to cancel the Pt's PET scan that is scheduled for tomorrow.  Sw will work on canceling the appointment in the morning as they are not open today.  Pt asked Sw that they just cancel her PET scan and she will work with her Oncologist to reschedule it.  She will be seeing her Oncologist on Thursday.    Sw discussed with her that [facility] will be called tomorrow morning to determine what O2 concentrator she will need given her O2 needs.    Sw will continue with discharge planning and will be available as needed until discharge.    ARLENE Ashton, LGBERNARDA  Inpatient Care Coordination  [facility]  [phone]

## 2024-07-14 NOTE — PROGRESS NOTES
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  After evaluation felt most likely she has radiation related pneumonitis/hypoxia.     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  Ms. Hudson smoked for about 40 pack years and quit in about 2005 when she had a myocardial infarction.  Her RCA was stented at that time and other less obstructive disease was noted.  She also has CKD stage III, essential hypertension and dyslipidemia.  She notes that she received chemoradiation that ended in early June.  Since completion, she has had progressive shortness of breath particularly with exertion.  She lives alone in a single-family home.  She has had difficulty with day-to-day activity.  He denies chest pain.  No clear fevers.  She has been coughing without much production.  In the ER, patient afebrile and normotensive.  Saturations in the high 80s on room air. CBC without leukocytosis.  BMP with creatinine close to baseline at 1.37.  Sodium mildly low 128, potassium 3.2.  Her BNP was elevated at 2200.  High-sensitivity troponin mildly elevated at 28 but stable on recheck.  She had a CT PE that showed no PE but diffuse, groundglass changes in both lungs with patchy airspace changes throughout the left lung with areas of consolidation.  No change to known primary  neoplasm.  Patient was given 40 mg of IV Lasix and started on ceftriaxone and azithromycin.    -Pulmonary consulted.  Colleague discussed with Dr. Connolly on 7/10.  Suspect radiation pneumonitis vs. Organizing pneumonia, more likely felt radiation.  Infectious workup thus far not remarkable.  Procal not elevated.  Viral panel negative.  1/2 blood culture positive for staph hominis which is likely contaminant.  Checking fungal markers and sputum culture which are still in process.  Current recommended plan is discharge with prednisone 60 mg daily with slow outpatient taper TBD by follow-up providers.   Patient to also d/c on daily bactrim for PJP ppx.  -  Completed azithromycin reasonable course and will d/c.  Will continue ceftriaxone/cephalosporin to complete 7 day course.  Have now moved to oral to complete last couple days.  -  Patient with variable O2 sats, but usually has been needing 4 L at rest.   With activity though she has more marked hypoxia and needs more O2.  Tried with staff more than once to do ambulatory O2 testing and consistently she has needed more than 6 L.  Today assessed and she needed 8-10 liters with activity and around the 4 L at rest.   It is unclear if the home O2 company can provide this.  SW will be discussing with them tomorrow.  If they can accommodate her, she could possibly d/c home tomorrow.   I only expect activity to be much of an issue at home as when she leaves home to go to a medical appt she should be in a wheelchair that also should be prescribed at discharge.  Family will be transporting her to appointments she reports.  She will d/c on prednisone 60 mg daily with outpatient pulm follow-up.  I suspect O2 needs will slowly improve but this may take several weeks.  -  Patient also to start daily Bactrim for PJP proph once cephalosporin course completes.    Mild elevation of troponin, suspect Type II MI  Akinetic inferior and basal inferoseptal  wall  Hypertension  hyperlipidemia:   Patient with mild elevation of her high-sensitivity troponin.  Stable on recheck.  Echocardiogram showed EF 50-55% with basal inferior, inferolateral and basal inferoseptal walls there were hypokinetic to akinetic.  It looks like this was noted on echocardiogram as well in 2022 in Care Everywhere.  -Continue on her home carvedilol, aspirin, statin.     Lung cancer, with primary in the left lower lobe, assoc bulky mediastinal ELPIDIO and two brain tumors at the time of diagnosis: Follow[facility].  Pt has been treated with carboplatin (reduced dose) and paclitaxel along with radiation therapy starting on 5/8 and with the last XRT on 6/19/2024.  Also was treated with stereotactic radiation to left frontal and temporal lobe lesions in April.  -Patient should follow-up with her outpatient oncologist.     Leukocytosis: Suspect secondary to steroids. No sign of worsening infection.      Severe malnutrition in context of chronic illness: Nutrition consulted.      Hypokalemia: Replacement protocol    Hyponatremia: Mild. Monitor.     CKD3: Looks like baseline creatinine is 1.2-1.5.  Creatinine at baseline.  Monitor.    PPE Used:  Mask          Diet: Combination Diet Regular Diet Adult  Snacks/Supplements Adult: Other; Ok for patient to order any oral supplements prn (takes at home); With Meals    DVT Prophylaxis: Enoxaparin (Lovenox) SQ  Ling Catheter: Not present  Lines: PRESENT      Port a Cath   Right Chest wall-Site Assessment: Other (Comment) (Port not accessed)    Cardiac Monitoring: None  Code Status: No CPR- Pre-arrest intubation OK      Clinically Significant Risk Factors              # Hypoalbuminemia: Lowest albumin = 2.6 g/dL at 7/10/2024  8:37 AM, will monitor as appropriate        # Chronic heart failure with preserved ejection fraction: heart failure noted on problem list and last echo with EF >50%             # Overweight: Estimated body mass index is 26.26  "kg/m  as calculated from the following:    Height as of this encounter: 1.626 m (5' 4\").    Weight as of this encounter: 69.4 kg (153 lb).   # Severe Malnutrition: based on nutrition assessment      # Financial/Environmental Concerns: none         Disposition Plan     Medically Ready for Discharge: Anticipated Tomorrow with home O2/Home care if the O2 company can meet her O2 needs.     John Joyner, DO  Hospitalist Service  [facility]  Securely message with [employer] (more info)  Text page via Wys[employer] Paging/Directory   ______________________________________________________________________    Interval History   Ms. Hudson had no new complaints.  She doesn't feel any more SOB.  She overall has felt better the last few days.  Since her periph IV is out she requested to not have an IV again for now.  If she needed one she requests her port be utilized.    Physical Exam   Vital Signs: Temp: 97.5  F (36.4  C) Temp src: Oral BP: 120/57 Pulse: 76   Resp: 18 SpO2: 91 % O2 Device: Nasal cannula Oxygen Delivery: 3 LPM  Weight: 153 lbs 0 oz    GEN:  Alert, oriented x 3, appears comfortable, no overt distress.  HEENT:  Normocephalic/atraumatic, no scleral icterus, no nasal discharge, mouth moist.  CV:  Regular rate and rhythm, no murmur or JVD.  S1 + S2 noted, no S3 or S4.  LUNGS:  Slightly coarse but moving air well, no wheezing/retractions.  Symmetric chest rise on inhalation noted.  ABD:  Active bowel sounds, soft, non-tender/non-distended.  No guarding/rigidity.  EXT:  Trace edema.  No cyanosis.  No new joint synovitis noted.  SKIN:  Dry to touch, no new exanthems noted in the visualized areas.    Medical Decision Making       38 MINUTES SPENT BY ME on the date of service doing chart review, history, exam, documentation & further activities per the note.      Data   Medications   Current Facility-Administered Medications   Medication Dose Route Frequency Provider Last Rate Last Admin     Current " Facility-Administered Medications   Medication Dose Route Frequency Provider Last Rate Last Admin    aspirin (ASA) chewable tablet 81 mg  81 mg Oral QPM Guzman Alvarez MD   81 mg at 07/13/24 2113    carvedilol (COREG) tablet 12.5 mg  12.5 mg Oral BID w/meals John Joyner DO   12.5 mg at 07/14/24 0817    cefdinir (OMNICEF) capsule 300 mg  300 mg Oral Q12H [facility] (08/20) John Joyner DO        enoxaparin ANTICOAGULANT (LOVENOX) injection 40 mg  40 mg Subcutaneous Q24H Guzman Alvarez MD   40 mg at 07/13/24 2113    ketotifen fumarate 0.035% ophthalmic solution 1 drop  1 drop Both Eyes BID Guzman Alvarez MD   1 drop at 07/14/24 0818    levothyroxine (SYNTHROID/LEVOTHROID) tablet 75 mcg  75 mcg Oral QAM AC Guzman Alvarez MD   75 mcg at 07/14/24 0654    pantoprazole (PROTONIX) EC tablet 40 mg  40 mg Oral Daily Guzman Alvarez MD   40 mg at 07/14/24 0817    predniSONE (DELTASONE) tablet 60 mg  60 mg Oral Daily Martha Connolly MD   60 mg at 07/14/24 0817    rosuvastatin (CRESTOR) tablet 40 mg  40 mg Oral At Bedtime Guzman Alvarez MD   40 mg at 07/13/24 2113    [START ON 7/16/2024] sulfamethoxazole-trimethoprim (BACTRIM) 400-80 MG per tablet 1 tablet  1 tablet Oral Daily John Joyner DO         Labs and Imaging results below reviewed today.    Recent Labs   Lab 07/13/24  0644 07/12/24  0655 07/11/24  0801   WBC 14.6* 14.4* 11.4*   HGB 12.0 11.4* 12.6   HCT 35.7 34.4* 37.2   MCV 89 89 88    376 406     7-Day Micro Results       Collected Updated Procedure Result Status      07/10/2024 1945 07/12/2024 1604 Histoplasma Galactomannan Antigen Quant by EIA, Urine [[id number]]   Urine, Clean Catch    Final result Component Value Units   Histoplasma Galactomannan Ag Quant, Urn Not Detected ng/mL   Histoplasma Galactomannan Ag Interp, Urn Not Detected    INTERPRETIVE DATA: Histoplasma Galactomannan Antigen                     Quantitative by EIA, Urine  Less than 0.4 ng/ml = Not Detected  0.4-0.7 ng/mL  = Detected (below the limit of quantification)  0.8-24.0 ng/mL = Detected  Greater than 24.0 ng/mL = Detected (above the limit of   quantification)    The quantitative range of this assay is 0.8-24.0 ng/mL.   Antigen concentrations between 0.4-.07 or >24.0 ng/mL fall   outside the linear range of the assay and cannot be   accurately quantified.    This EIA test should be used in conjunction with other   diagnostic procedures, including microbiological culture,   histological examination of biopsy samples, and/or   radiographic evidence, to aid in the diagnosis of   histoplasmosis.    This test was developed and its performance characteristics   determined by [facility]. It has not been cleared or   approved by the U.S. Food and Drug Administration. This   test was performed in a CLIA-certified laboratory and is   intended for clinical purposes.  Performed By: [facility]  [address] [place] [id number]  : Enrique Chavez MD, PhD  CLIA Number: [id number]            07/10/2024 1945 07/10/2024 2022 Blastomyces Agn Quant EIA Non Blood [[id number]]   Urine, Clean Catch    In process Component Value   No component results            07/10/2024 1920 07/13/2024 1931 Blood Culture Hand, Right [[id number]]   Blood from Hand, Right    Preliminary result Component Value   Culture No growth after 3 days  [P]                07/10/2024 1920 07/13/2024 1931 Blood Culture Hand, Left [[id number]]   Blood from Hand, Left    Preliminary result Component Value   Culture No growth after 3 days  [P]                07/10/2024 1737 07/12/2024 1345 Aspergillus Galactomannan Antigen [[id number]]   Blood from Hand, Right    Final result Component Value   Aspergillus Galactomannan Index 0.03   Aspergillus Galact AG Negative   INTERPRETIVE INFORMATION: Aspergillus Galactomannan Antigen   by EIA  Negative results do not exclude the diagnosis of invasive  aspergillosis. A single positive test  result (index equal  to or greater than 0.5) should be clinically correlated  by testing a separate serum specimen because many agents  (e.g. foods, antibiotics) may cross-react with the test.  If invasive aspergillosis is suspected in high-risk  patients, serial sampling is recommended.  Performed By: [facility]  [address] [place] [address]  : Enrique Chavez MD, PhD  CLIA Number: [id number]            07/10/2024 1737 07/12/2024 1538 1,3 Beta D glucan fungitell [[id number]]   Blood from Hand, Right    Final result Component Value Units   (1,3)-Beta-D-Glucan 32 pg/mL   B-D GLUCAN INTERPRETATION (1,3) Negative      The specimen submitted for (1,3)-Beta-D-Glucan (Fungitell)   testing contained questionable amounts of interfering   substances which may alter the result. Re-collect if   clinically indicated.  INTERPRETIVE INFORMATION: (1,3)-beta-D-glucan (Fungitell)      Less than 31 pg/mL ................... Negative    31-59 pg/mL .......................... Negative    60-79 pg/mL .......................... Indeterminate    Greater than or equal to 80 pg/mL .... Positive    The Fungitell test is indicated for presumptive diagnosis   of fungal infection and should be used in conjunction with   other diagnostic procedures. This test does not detect   certain fungal species such as Cryptococcus, which produce   very low levels of (1,3)-beta-D-glucan. This test will not   detect the zygomycetes, such as Absidia, Mucor, and   Rhizopus, which are not known to produce   (1,3)-beta-D-glucan. In addition, the yeast phase of   Blastomyces dermatitidis produces little   (1,3)-beta-D-glucan and may not be detected by the assay.  Performed By: [facility]  [address] [place], [place] [id number]  : Enrique Chavez MD, PhD  CLIA Number: [id number]            07/09/2024 2205 07/10/2024 0151 Respiratory Panel PCR [[id number]]    Swab from Nasopharyngeal     Final result Component Value   Adenovirus Not Detected   Coronavirus Not Detected   This test detects Coronavirus 229E, HKU1, NL63 and OC43 but does not distinguish between them. It does not detect MERS ( Respiratory Syndrome), SARS (Severe Acute Respiratory Syndrome) or 2019-nCoV (Novel 2019) Coronavirus.   Human Metapneumovirus Not Detected   Human Rhin/Enterovirus Not Detected   Influenza A Not Detected   Influenza A, H1 Not Detected   Influenza A 2009 H1N1 Not Detected   Influenza A, H3 Not Detected   Influenza B Not Detected   Parainfluenza Virus 1 Not Detected   Parainfluenza Virus 2 Not Detected   Parainfluenza Virus 3 Not Detected   Parainfluenza Virus 4 Not Detected   Respiratory Syncytial Virus A Not Detected   Respiratory Syncytial Virus B Not Detected   Chlamydia Pneumoniae Not Detected   Mycoplasma Pneumoniae Not Detected            07/09/2024 2045 07/13/2024 2246 Blood Culture Hand, Right [[id number]]   Blood from Hand, Right    Preliminary result Component Value   Culture No growth after 4 days  [P]                07/09/2024 2034 07/12/2024 0722 Blood Culture Peripheral Blood [[id number]]    (Abnormal)   Peripheral Blood    Final result Component Value   Culture Positive on the 1st day of incubation    Staphylococcus hominis    1 of 2 bottles        Susceptibility        Staphylococcus hominis      JUAN      Ciprofloxacin <=0.5 ug/mL Susceptible      Clindamycin 0.25 ug/mL Susceptible  [1]       Daptomycin 0.25 ug/mL Susceptible      Doxycycline 1 ug/mL Susceptible      Erythromycin >=8 ug/mL Resistant      Gentamicin <=0.5 ug/mL Susceptible      Levofloxacin <=0.12 ug/mL Susceptible      Oxacillin <=0.25 ug/mL Susceptible  [2]       Tetracycline <=1 ug/mL Susceptible      Vancomycin 1 ug/mL Susceptible                   [1]  This isolate DOES NOT demonstrate inducible clindamycin resistance in vitro. Clindamycin is susceptible and could be used when indicated, however, erythromycin  "is resistant and should not be used.     [2]  Oxacillin susceptible isolates are susceptible to cephalosporins (example: cefazolin and cephalexin) and beta lactam combination agents. Oxacillin resistant isolates are resistant to these agents.               Susceptibility Comments       Staphylococcus hominis    Antibiotics listed as \"No Interpretation\" have no regulatory guidelines for susceptibility/resistance available.               07/09/2024 2034 07/10/2024 1759 Bionanoplusigene GP Panel [[id number]]    (Abnormal)   Peripheral Blood    Final result Component Value   Staphylococcus species Detected   Positive for coagulase-negative Staphylococci, other than Staphylococcus epidermidis and Staphylococcus lugdunensis, by [employer] multiplex nucleic acid test. Coagulase-negative Staphylococci are the most common venipuncture or collection associated skin contaminants grown in blood cultures. Final identification and antimicrobial susceptibility testing will be verified by standard methods.   Staphylococcus aureus Not Detected   Staphylococcus epidermidis Not Detected   Staphylococcus lugdunensis Not Detected   Enterococcus faecalis Not Detected   Enterococcus faecium Not Detected   Streptococcus species Not Detected   Streptococcus agalactiae Not Detected   Streptococcus anginosus group Not Detected   Streptococcus pneumoniae Not Detected   Streptococcus pyogenes Not Detected   Listeria species Not Detected                  Recent Labs   Lab 07/13/24  0644 07/12/24  1400 07/12/24  0655 07/11/24  0801 07/10/24  1737 07/10/24  0837 07/09/24  1956 07/09/24  1744   *  --   --  132*  --  131*  --  128*   POTASSIUM 3.7 3.7  --  3.7   < > 2.9*  --  3.2*   CHLORIDE 96*  --   --  94*  --  92*  --  90*   CO2 25  --   --  23  --  25  --  25   ANIONGAP 12  --   --  15  --  14  --  13   *  --   --  161*  --  99  --  104*   BUN 19.9  --   --  17.9  --  19.2  --  22.3   CR 1.10*  --   --  1.13*  --  1.39*   < > 1.37* "   GFRESTIMATED 52*  --   --  50*  --  39*   < > 40*   OMAR 8.7*  --   --  9.0  --  8.2*  --  8.7*   MAG 2.3  --  2.3 2.2  --   --    < >  --    PROTTOTAL  --   --   --   --   --  6.2*  --  6.3*   ALBUMIN  --   --   --   --   --  2.6*  --  2.8*   BILITOTAL  --   --   --   --   --  0.3  --  0.6   ALKPHOS  --   --   --   --   --  73  --  78   AST  --   --   --   --   --  83*  --  101*   ALT  --   --   --   --   --  45  --  52*    < > = values in this interval not displayed.     No results found for this or any previous visit (from the past 24 hour(s)).

## 2024-07-14 NOTE — PROGRESS NOTES
Patient without new complaints.  O2 needs still unclear with activity.  Appears to need 4L continuous via NC at rest.  With activity still getting variable O2 sats 79-87 on 6L.  6L does not appear to be adequate.   I have asked therapy/nursing to assess what is needed to aim for upper 80 sats even if > 6 L.  I also requested they try a mask when she ambulates as she appears to mouth breath somewhat when active.   Once need determined, we can approach oxygen company and see if they can meet that need.  Her ambulation is quite limited at home, only bathroom/kitchen, etc.   Mostly she will be at rest or if she goes to a clinic appointment it will be via wheelchair which she also needs arranged.  I also spoke with SW and asked they assist tomorrow AM rescheduling her PET scan she had scheduled outpatient tomorrow.    Likely home tomorrow if her O2 needs can be determined and the outpatient oxygen company can meet them in her home.  I expect her hypoxia to slowly improve, though likely over weeks given the suspicion this is radiation induced.   As she is near d/c too, I will stop IV meds and move to oral today.  Full prog note to follow.

## 2024-07-14 NOTE — PLAN OF CARE
"Pt alert and oriented, denies pain. Desating to 88%, O2 increased to 5L oxymask, currently sating 92%. PIV removed. Port not accessed. Voiding adequately.     Problem: Adult Inpatient Plan of Care  Goal: Plan of Care Review  Description: The Plan of Care Review/Shift note should be completed every shift.  The Outcome Evaluation is a brief statement about your assessment that the patient is improving, declining, or no change.  This information will be displayed automatically on your shift  note.  Outcome: Progressing  Flowsheets (Taken 7/14/2024 0728)  Outcome Evaluation: Pt alert and oriented, denies pain. Desating to 88%, O2 increased to 5L oxymask, currently sating 92%. PIV removed. Port not accessed. Voiding adequately.  Plan of Care Reviewed With: patient  Overall Patient Progress: no change  Goal: Patient-Specific Goal (Individualized)  Description: You can add care plan individualizations to a care plan. Examples of Individualization might be:  \"Parent requests to be called daily at 9am for status\", \"I have a hard time hearing out of my right ear\", or \"Do not touch me to wake me up as it startles  me\".  Outcome: Progressing  Goal: Absence of Hospital-Acquired Illness or Injury  Outcome: Progressing  Intervention: Identify and Manage Fall Risk  Recent Flowsheet Documentation  Taken 7/13/2024 2100 by Crystal Tavarez RN  Safety Promotion/Fall Prevention:   activity supervised   clutter free environment maintained   nonskid shoes/slippers when out of bed   room near nurse's station   safety round/check completed  Intervention: Prevent Skin Injury  Recent Flowsheet Documentation  Taken 7/13/2024 2100 by Crystal Tavarez, RN  Body Position: position changed independently  Device Skin Pressure Protection: absorbent pad utilized/changed  Intervention: Prevent and Manage VTE (Venous Thromboembolism) Risk  Recent Flowsheet Documentation  Taken 7/13/2024 2100 by Crystal Tavarez RN  VTE " Prevention/Management: SCDs off (sequential compression devices)  Intervention: Prevent Infection  Recent Flowsheet Documentation  Taken 7/13/2024 2100 by Crystal Tavarez, RN  Infection Prevention:   equipment surfaces disinfected   hand hygiene promoted   rest/sleep promoted   single patient room provided  Goal: Optimal Comfort and Wellbeing  Outcome: Progressing  Intervention: Monitor Pain and Promote Comfort  Recent Flowsheet Documentation  Taken 7/13/2024 2100 by Crystal Tavarez, RN  Pain Management Interventions: declines  Goal: Readiness for Transition of Care  Outcome: Progressing     Problem: Gas Exchange Impaired  Goal: Optimal Gas Exchange  Outcome: Progressing  Intervention: Optimize Oxygenation and Ventilation  Recent Flowsheet Documentation  Taken 7/13/2024 2100 by Crystal Tavarez, RN  Head of Bed (HOB) Positioning: HOB at 45 degrees     Problem: Fall Injury Risk  Goal: Absence of Fall and Fall-Related Injury  Outcome: Progressing  Intervention: Identify and Manage Contributors  Recent Flowsheet Documentation  Taken 7/13/2024 2100 by Crystal Tavarez, RN  Medication Review/Management: medications reviewed  Intervention: Promote Injury-Free Environment  Recent Flowsheet Documentation  Taken 7/13/2024 2100 by Crystal Tavarez, RN  Safety Promotion/Fall Prevention:   activity supervised   clutter free environment maintained   nonskid shoes/slippers when out of bed   room near nurse's station   safety round/check completed   Goal Outcome Evaluation:      Plan of Care Reviewed With: patient    Overall Patient Progress: no changeOverall Patient Progress: no change    Outcome Evaluation: Pt alert and oriented, denies pain. Desating to 88%, O2 increased to 5L oxymask, currently sating 92%. PIV removed. Port not accessed. Voiding adequately.

## 2024-07-15 ENCOUNTER — APPOINTMENT (OUTPATIENT)
Dept: OCCUPATIONAL THERAPY | Facility: CLINIC | Age: 79
DRG: 205 | End: 2024-07-15
Payer: COMMERCIAL

## 2024-07-15 ENCOUNTER — APPOINTMENT (OUTPATIENT)
Dept: PHYSICAL THERAPY | Facility: CLINIC | Age: 79
DRG: 205 | End: 2024-07-15
Payer: COMMERCIAL

## 2024-07-15 LAB
BACTERIA BLD CULT: NO GROWTH
BACTERIA BLD CULT: NO GROWTH
SCANNED LAB RESULT: NORMAL

## 2024-07-15 PROCEDURE — 120N000001 HC R&B MED SURG/OB

## 2024-07-15 PROCEDURE — 250N000013 HC RX MED GY IP 250 OP 250 PS 637: Performed by: INTERNAL MEDICINE

## 2024-07-15 PROCEDURE — 99233 SBSQ HOSP IP/OBS HIGH 50: CPT | Performed by: INTERNAL MEDICINE

## 2024-07-15 PROCEDURE — 97530 THERAPEUTIC ACTIVITIES: CPT | Mod: GO

## 2024-07-15 PROCEDURE — 97535 SELF CARE MNGMENT TRAINING: CPT | Mod: GO

## 2024-07-15 PROCEDURE — 250N000012 HC RX MED GY IP 250 OP 636 PS 637: Performed by: STUDENT IN AN ORGANIZED HEALTH CARE EDUCATION/TRAINING PROGRAM

## 2024-07-15 PROCEDURE — 97530 THERAPEUTIC ACTIVITIES: CPT | Mod: GP

## 2024-07-15 PROCEDURE — 250N000011 HC RX IP 250 OP 636: Performed by: INTERNAL MEDICINE

## 2024-07-15 RX ADMIN — LEVOTHYROXINE SODIUM 75 MCG: 0.07 TABLET ORAL at 06:31

## 2024-07-15 RX ADMIN — PREDNISONE 60 MG: 20 TABLET ORAL at 09:11

## 2024-07-15 RX ADMIN — ASPIRIN 81 MG CHEWABLE TABLET 81 MG: 81 TABLET CHEWABLE at 20:03

## 2024-07-15 RX ADMIN — CARVEDILOL 12.5 MG: 6.25 TABLET, FILM COATED ORAL at 18:50

## 2024-07-15 RX ADMIN — CEFDINIR 300 MG: 300 CAPSULE ORAL at 09:12

## 2024-07-15 RX ADMIN — ROSUVASTATIN CALCIUM 40 MG: 20 TABLET, FILM COATED ORAL at 21:38

## 2024-07-15 RX ADMIN — KETOTIFEN FUMARATE 1 DROP: 0.25 SOLUTION/ DROPS OPHTHALMIC at 09:13

## 2024-07-15 RX ADMIN — CEFDINIR 300 MG: 300 CAPSULE ORAL at 20:03

## 2024-07-15 RX ADMIN — PANTOPRAZOLE SODIUM 40 MG: 40 TABLET, DELAYED RELEASE ORAL at 09:12

## 2024-07-15 RX ADMIN — CARVEDILOL 12.5 MG: 6.25 TABLET, FILM COATED ORAL at 09:11

## 2024-07-15 RX ADMIN — ENOXAPARIN SODIUM 40 MG: 40 INJECTION SUBCUTANEOUS at 21:38

## 2024-07-15 RX ADMIN — KETOTIFEN FUMARATE 1 DROP: 0.25 SOLUTION/ DROPS OPHTHALMIC at 20:04

## 2024-07-15 ASSESSMENT — ACTIVITIES OF DAILY LIVING (ADL)
ADLS_ACUITY_SCORE: 25

## 2024-07-15 NOTE — PLAN OF CARE
"Care from [id number]  End of Shift Summary  For vital signs and complete assessments, please see documentation flowsheets.     Pertinent assessments: A&O x4. VSS, 2L NC. Up w/ SBA gbw. Ambulated halls before bed. SOB w/ activity but denies dizziness/lightheadedness. Bed alarm on. Call light w/n reach.    Major Shift Events: N/A    Treatment Plan: Omnicef, wean O2 as able, encourage activity.    Bedside Nurse: Esperanza Ng RN     Goal Outcome Evaluation:      Plan of Care Reviewed With: patient    Overall Patient Progress: improvingOverall Patient Progress: improving    Outcome Evaluation: Earnestine 2L NC. Ambulated halls before bed.    Problem: Adult Inpatient Plan of Care  Goal: Plan of Care Review  Description: The Plan of Care Review/Shift note should be completed every shift.  The Outcome Evaluation is a brief statement about your assessment that the patient is improving, declining, or no change.  This information will be displayed automatically on your shift  note.  Outcome: Progressing  Flowsheets (Taken 7/14/2024 2239)  Outcome Evaluation: Earnestine 2L NC. Ambulated halls before bed.  Plan of Care Reviewed With: patient  Overall Patient Progress: improving  Goal: Patient-Specific Goal (Individualized)  Description: You can add care plan individualizations to a care plan. Examples of Individualization might be:  \"Parent requests to be called daily at 9am for status\", \"I have a hard time hearing out of my right ear\", or \"Do not touch me to wake me up as it startles  me\".  Outcome: Progressing  Goal: Absence of Hospital-Acquired Illness or Injury  Outcome: Progressing  Intervention: Identify and Manage Fall Risk  Recent Flowsheet Documentation  Taken 7/14/2024 2058 by Esperanza Benson, RN  Safety Promotion/Fall Prevention:   activity supervised   clutter free environment maintained   increase visualization of patient   nonskid shoes/slippers when out of bed   patient and family education   safety round/check completed   " treat reversible contributory factors   treat underlying cause  Goal: Optimal Comfort and Wellbeing  Outcome: Progressing  Goal: Readiness for Transition of Care  Outcome: Progressing

## 2024-07-15 NOTE — PROGRESS NOTES
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  After evaluation felt most likely she has radiation related pneumonitis/hypoxia.     Acute hypoxemic respiratory failure  Probable radiation pneumonitis, felt less likely community-acquired pneumonia  Immunosuppressed with malignancy and recent chemoradiation:     Summary:  Medical history notable for left lower lobe adenocarcinoma with bulky mediastinal lymphadenopathy and 2 brain metastases that were ultimately diagnosed in April 2024.  Ms. Hudson smoked for about 40 pack years and quit in about 2005 when she had a myocardial infarction.  Her RCA was stented at that time and other less obstructive disease was noted.  She also has CKD stage III, essential hypertension and dyslipidemia.    -She notes that she received chemoradiation that ended in early June.    -Since completion, she has had progressive shortness of breath particularly with exertion.  She lives alone in a single-family home.  -  She has had difficulty with day-to-day activity.  He denies chest pain.  No clear fevers.  She has been coughing without much production.  In the ER, patient afebrile and normotensive.    -Saturations in the high 80s on room air. CBC without leukocytosis.  BMP with creatinine close to baseline at 1.37.  Sodium mildly low 128, potassium 3.2.  Her BNP was elevated at 2200.    -High-sensitivity troponin mildly elevated at 28 but stable on recheck.  She had a CT PE that showed no PE but diffuse, groundglass changes in both lungs with patchy airspace changes throughout the left lung with areas of consolidation.  No change  to known primary neoplasm.  Patient was given 40 mg of IV Lasix and started on ceftriaxone and azithromycin.    -Pulmonary service evaluation pursued and consulted.    -Our colleague discussed with Dr. Connolly on 7/10.    -Suspect radiation pneumonitis vs. Organizing pneumonia, more likely felt radiation.  Infectious workup thus far not remarkable.  Procal not elevated.  Viral panel negative.  1/2 blood culture positive for staph hominis which is likely contaminant.    -Checking fungal markers and sputum culture which are still in process.  Current recommended plan is discharge with prednisone 60 mg daily with slow outpatient taper TBD by follow-up providers.    - Patient to also d/c on daily bactrim for PJP ppx.  -  Completed azithromycin reasonable course and has been discontinued.    Will continue ceftriaxone/cephalosporin to complete 7 day course.   -She finished course of antibiotics earlier  -  Patient with variable O2 sats, but usually has been needing 4 L at rest.     -Still requiring up to 10 L of oxygen by nasal cannula during ambulation   -Highly appreciate continuous and extensive input from  with arrangements and referrals being made regarding oxygen support at home   -Still unclear if home oxygen support can meet this oxygen needs upon discharge  .  She will d/c on prednisone 60 mg daily with outpatient pulm follow-up.  I suspect O2 needs will slowly improve but this may take several weeks.  -Started on Bactrim for PJP prophylaxis while on high-dose steroids    Mild elevation of troponin, suspect Type II MI  Akinetic inferior and basal inferoseptal wall  Hypertension  hyperlipidemia:   Patient with mild elevation of her high-sensitivity troponin.  Stable on recheck.  Echocardiogram showed EF 50-55% with basal inferior, inferolateral and basal inferoseptal walls there were hypokinetic to akinetic.  It looks like this was noted on echocardiogram as well in 2022 in Care  "Everywhere.  -Continue on her home carvedilol, aspirin, statin.     Lung cancer, with primary in the left lower lobe, assoc bulky mediastinal ELPIDIO and two brain tumors at the time of diagnosis: Followed through [facility].  Pt has been treated with carboplatin (reduced dose) and paclitaxel along with radiation therapy starting on 5/8 and with the last XRT on 6/19/2024.  Also was treated with stereotactic radiation to left frontal and temporal lobe lesions in April.  -Patient should follow-up with her outpatient oncologist.     Leukocytosis: Suspect secondary to steroids. No sign of worsening infection.      Severe malnutrition in context of chronic illness: Nutrition consulted.      Hypokalemia: Replacement protocol    Hyponatremia: Mild. Monitor.     CKD3: Looks like baseline creatinine is 1.2-1.5.  Creatinine at baseline.  Monitor.              Diet: Combination Diet Regular Diet Adult  Snacks/Supplements Adult: Other; Ok for patient to order any oral supplements prn (takes at home); With Meals    DVT Prophylaxis: Enoxaparin (Lovenox) SQ and Ambulate every shift  Ling Catheter: Not present  Lines: PRESENT      Port a Cath 05/06/24  Right Chest wall-Site Assessment: Other (Comment) (not accessed)      Cardiac Monitoring: None  Code Status: No CPR- Pre-arrest intubation OK      Clinically Significant Risk Factors              # Hypoalbuminemia: Lowest albumin = 2.6 g/dL at 7/10/2024  8:37 AM, will monitor as appropriate      # Chronic heart failure with preserved ejection fraction: heart failure noted on problem list and last echo with EF >50%             # Overweight: Estimated body mass index is 26.26 kg/m  as calculated from the following:    Height as of this encounter: 1.626 m (5' 4\").    Weight as of this encounter: 69.4 kg (153 lb).   # Severe Malnutrition: based on nutrition assessment    # Financial/Environmental Concerns: none         Disposition Plan     Medically Ready for Discharge: Anticipating in " the next 24 to 36 hours if oxygen support can be set up with current high needs more pronounced during ambulation             Sanya Mccann MD, MD  Hospitalist Service  [facility]  Securely message with [employer] (more info)  Text page via [employer] Paging/Directory   ______________________________________________________________________    Interval History   I assumed medicine service care today.  Seen and examined.  Chart reviewed.  Case discussed with nursing service.  Family updated this patient's daughter present at bedside during my encounter.  I met this lady this morning while she is about to eat her food tray.  She appears to be in better spirits as she thinks she is a little bit stronger but did not have much sleep last night.  No reports of any mental status changes.  She is endorsing no ongoing nausea, vomiting nor diarrhea.  No reported bleeding tendencies.  No mental status changes.  Working with therapy services.  Unfortunately she still requiring significant amount of oxygen even up to 10 L of oxygen by nasal cannula during ambulation.  Continuous to demonstrate stable hemodynamics.  Afebrile.    Physical Exam   Vital Signs: Temp: 97.4  F (36.3  C) Temp src: Oral BP: 127/64 Pulse: 84   Resp: 18 SpO2: 90 % O2 Device: Nasal cannula Oxygen Delivery: 6 LPM  Weight: 153 lbs 0 oz    HEENT; Atraumatic, normocephalic, pinkish conjuctiva, pupils bilateral reactive   Skin: warm and moist, no rashes  Lungs: equal chest expansion, coarse breath sounds no wheezes, no stridor, no crackles,   Heart: normal rate, normal rhythm, no rubs or gallops.   Abdomen: normal bowel sounds, no tenderness, no peritoneal signs, no guarding  Extremities: no deformities, no edema   Neuro; follow commands, alert and oriented x3, spontaneous speech, coherent, moves all extremities spontaneously  Psych; no hallucination, euthymic mood, not agitated      Medical Decision Making       50 MINUTES SPENT BY ME on the  date of service doing chart review, history, exam, documentation & further activities per the note.  MANAGEMENT DISCUSSED with the following over the past 24 hours: Yes   NOTE(S)/MEDICAL RECORDS REVIEWED over the past 24 hours: Yes       Data         Imaging results reviewed over the past 24 hrs:   No results found for this or any previous visit (from the past 24 hour(s)).

## 2024-07-15 NOTE — PLAN OF CARE
"To Do:  End of Shift Summary  For vital signs and complete assessments, please see documentation flowsheets.     Pertinent assessments: Pt is A/Ox4. VSS on 2L NC. Denies pain and N/V. Dyspnea upon exertion. PIV SL. Regular diet. Loose stools this shift.     Major Shift Events: Home oxygen assessment completed.    Treatment Plan: Abx. PT, OT, SW, and pulmonary following. Wean O2.    Bedside Nurse: Taylor Waldron RN     Goal Outcome Evaluation:      Plan of Care Reviewed With: patient    Overall Patient Progress: improvingOverall Patient Progress: improving    Outcome Evaluation: 2L NC. IV abx.      Problem: Adult Inpatient Plan of Care  Goal: Plan of Care Review  Description: The Plan of Care Review/Shift note should be completed every shift.  The Outcome Evaluation is a brief statement about your assessment that the patient is improving, declining, or no change.  This information will be displayed automatically on your shift  note.  Outcome: Progressing  Flowsheets (Taken 7/14/2024 1900)  Outcome Evaluation: 2L NC. IV abx.  Plan of Care Reviewed With: patient  Overall Patient Progress: improving  Goal: Patient-Specific Goal (Individualized)  Description: You can add care plan individualizations to a care plan. Examples of Individualization might be:  \"Parent requests to be called daily at 9am for status\", \"I have a hard time hearing out of my right ear\", or \"Do not touch me to wake me up as it startles  me\".  Outcome: Progressing  Goal: Absence of Hospital-Acquired Illness or Injury  Outcome: Progressing  Intervention: Identify and Manage Fall Risk  Recent Flowsheet Documentation  Taken 7/14/2024 9527 by Taylor Waldron, RN  Safety Promotion/Fall Prevention:   activity supervised   assistive device/personal items within reach   clutter free environment maintained   lighting adjusted   nonskid shoes/slippers when out of bed   room near nurse's station   safety round/check completed  Intervention: Prevent Skin " Injury  Recent Flowsheet Documentation  Taken 7/14/2024 0817 by Taylor Waldron RN  Body Position:   position changed independently   sitting up in bed  Intervention: Prevent and Manage VTE (Venous Thromboembolism) Risk  Recent Flowsheet Documentation  Taken 7/14/2024 0817 by Taylor Waldron RN  VTE Prevention/Management: SCDs off (sequential compression devices)  Intervention: Prevent Infection  Recent Flowsheet Documentation  Taken 7/14/2024 1100 by Taylor Waldron RN  Infection Prevention:   rest/sleep promoted   single patient room provided  Taken 7/14/2024 0817 by Taylor Waldron RN  Infection Prevention:   rest/sleep promoted   single patient room provided  Goal: Optimal Comfort and Wellbeing  Outcome: Progressing  Goal: Readiness for Transition of Care  Outcome: Progressing     Problem: Gas Exchange Impaired  Goal: Optimal Gas Exchange  Outcome: Progressing     Problem: Fall Injury Risk  Goal: Absence of Fall and Fall-Related Injury  Outcome: Progressing  Intervention: Identify and Manage Contributors  Recent Flowsheet Documentation  Taken 7/14/2024 0817 by Taylor Waldron RN  Medication Review/Management: medications reviewed  Intervention: Promote Injury-Free Environment  Recent Flowsheet Documentation  Taken 7/14/2024 0817 by Taylor Waldron RN  Safety Promotion/Fall Prevention:   activity supervised   assistive device/personal items within reach   clutter free environment maintained   lighting adjusted   nonskid shoes/slippers when out of bed   room near nurse's station   safety round/check completed

## 2024-07-15 NOTE — PLAN OF CARE
"Pertinent assessments: A&O, up sba in room. Int 3-4L at rest. Denies pain or nausea. SOB with exertion. On Bactrim prophylactic.      Major Shift Events: Ambulated hallways w/ OT, required 10L during activity.    Treatment Plan: Attempt to wean O2 down, encourage IS use and activity.     Bedside Nurse: Ariadna Marie RN       Problem: Adult Inpatient Plan of Care  Goal: Plan of Care Review  Description: The Plan of Care Review/Shift note should be completed every shift.  The Outcome Evaluation is a brief statement about your assessment that the patient is improving, declining, or no change.  This information will be displayed automatically on your shift  note.  7/15/2024 1856 by Ariadna Marie, RN  Outcome: Progressing  Flowsheets (Taken 7/15/2024 1856)  Outcome Evaluation: Treatment Plan: Attempt to wean O2 down, encourage IS use and activity.  Plan of Care Reviewed With: patient  Overall Patient Progress: improving  7/15/2024 1347 by Ariadna Marie RN  Flowsheets (Taken 7/15/2024 1338)  Plan of Care Reviewed With: patient  Overall Patient Progress: no change  Goal: Patient-Specific Goal (Individualized)  Description: You can add care plan individualizations to a care plan. Examples of Individualization might be:  \"Parent requests to be called daily at 9am for status\", \"I have a hard time hearing out of my right ear\", or \"Do not touch me to wake me up as it startles  me\".  Outcome: Progressing  Goal: Absence of Hospital-Acquired Illness or Injury  Outcome: Progressing  Intervention: Identify and Manage Fall Risk  Recent Flowsheet Documentation  Taken 7/15/2024 1034 by Ariadna Marie, RN  Safety Promotion/Fall Prevention:   activity supervised   clutter free environment maintained   increase visualization of patient   nonskid shoes/slippers when out of bed  Intervention: Prevent and Manage VTE (Venous Thromboembolism) Risk  Recent Flowsheet Documentation  Taken 7/15/2024 1034 by Ariadna Marie, " RN  VTE Prevention/Management: SCDs off (sequential compression devices)  Goal: Optimal Comfort and Wellbeing  Outcome: Progressing  Goal: Readiness for Transition of Care  Outcome: Progressing     Problem: Gas Exchange Impaired  Goal: Optimal Gas Exchange  Outcome: Progressing     Problem: Fall Injury Risk  Goal: Absence of Fall and Fall-Related Injury  Outcome: Progressing  Intervention: Identify and Manage Contributors  Recent Flowsheet Documentation  Taken 7/15/2024 1034 by Ariadna Marie RN  Medication Review/Management: medications reviewed  Intervention: Promote Injury-Free Environment  Recent Flowsheet Documentation  Taken 7/15/2024 1034 by Ariadna Marie RN  Safety Promotion/Fall Prevention:   activity supervised   clutter free environment maintained   increase visualization of patient   nonskid shoes/slippers when out of bed   Goal Outcome Evaluation:      Plan of Care Reviewed With: patient    Overall Patient Progress: improvingOverall Patient Progress: improving    Outcome Evaluation: Treatment Plan: Attempt to wean O2 down, encourage IS use and activity.

## 2024-07-15 NOTE — PROGRESS NOTES
Care Management Follow Up    Length of Stay (days): 6    Expected Discharge Date: 07/15/2024     Concerns to be Addressed: discharge planning     Patient plan of care discussed at interdisciplinary rounds: Yes    Anticipated Discharge Disposition: Home, Home Care     Anticipated Discharge Services: Home Care  Anticipated Discharge DME: Oxygen    Patient/family educated on Medicare website which has current facility and service quality ratings: yes  Education Provided on the Discharge Plan: Yes  Patient/Family in Agreement with the Plan: yes    Referrals Placed by CM/SW: Homecare  Private pay costs discussed: Not applicable    Additional Information:  Per pt's request, Sw called [facility] ([facility] and cancelled the pt's PET scan scheduled for 1400 today P: [phone].  Pt reports that she will reschedule her appointment.  Pt's O2 continues to fluctuate.  The pt is going to go for a walk with staff and see what her O2 needs are with activity.  After that, Sw will determine moving forward with O2 needs.  Sw will update [facility] when the pt discharges, for them to begin services.    Sw will continue with discharge planning and will be available as needed until discharge.    ARLENE Ashton, [facility]  Inpatient Care Coordination  [facility]  [phone]

## 2024-07-15 NOTE — CONSULTS
SPIRITUAL HEALTH SERVICES - Consult Note  RH MS 5    Referral Source: [facility] consult per length of stay.    Oriented pt Dasia to [facility] services, she welcomed prayer for healing for her lungs, no other spiritual needs at this time.    Plan: I and other chaplains remain available on request.    Bautista Fall    Intern     [facility] routine referrals?*[phone]  [facility] available 24/7 for emergent requests/referrals, either by paging the on-call  or by entering an ASAP/STAT consult in Epic (this will also page the on-call ).

## 2024-07-16 ENCOUNTER — APPOINTMENT (OUTPATIENT)
Dept: OCCUPATIONAL THERAPY | Facility: CLINIC | Age: 79
DRG: 205 | End: 2024-07-16
Payer: COMMERCIAL

## 2024-07-16 ENCOUNTER — APPOINTMENT (OUTPATIENT)
Dept: PHYSICAL THERAPY | Facility: CLINIC | Age: 79
DRG: 205 | End: 2024-07-16
Payer: COMMERCIAL

## 2024-07-16 LAB
CREAT SERPL-MCNC: 1.02 MG/DL (ref 0.51–0.95)
EGFRCR SERPLBLD CKD-EPI 2021: 57 ML/MIN/1.73M2
PLATELET # BLD AUTO: 380 10E3/UL (ref 150–450)

## 2024-07-16 PROCEDURE — 250N000013 HC RX MED GY IP 250 OP 250 PS 637: Performed by: INTERNAL MEDICINE

## 2024-07-16 PROCEDURE — 97110 THERAPEUTIC EXERCISES: CPT | Mod: GP

## 2024-07-16 PROCEDURE — 36415 COLL VENOUS BLD VENIPUNCTURE: CPT | Performed by: INTERNAL MEDICINE

## 2024-07-16 PROCEDURE — 99233 SBSQ HOSP IP/OBS HIGH 50: CPT | Performed by: INTERNAL MEDICINE

## 2024-07-16 PROCEDURE — 97530 THERAPEUTIC ACTIVITIES: CPT | Mod: GP

## 2024-07-16 PROCEDURE — 120N000001 HC R&B MED SURG/OB

## 2024-07-16 PROCEDURE — 250N000011 HC RX IP 250 OP 636: Performed by: INTERNAL MEDICINE

## 2024-07-16 PROCEDURE — 250N000012 HC RX MED GY IP 250 OP 636 PS 637: Performed by: STUDENT IN AN ORGANIZED HEALTH CARE EDUCATION/TRAINING PROGRAM

## 2024-07-16 PROCEDURE — 85049 AUTOMATED PLATELET COUNT: CPT | Performed by: INTERNAL MEDICINE

## 2024-07-16 PROCEDURE — 97535 SELF CARE MNGMENT TRAINING: CPT | Mod: GO

## 2024-07-16 PROCEDURE — 82565 ASSAY OF CREATININE: CPT | Performed by: INTERNAL MEDICINE

## 2024-07-16 RX ADMIN — ROSUVASTATIN CALCIUM 40 MG: 20 TABLET, FILM COATED ORAL at 21:01

## 2024-07-16 RX ADMIN — CARVEDILOL 12.5 MG: 6.25 TABLET, FILM COATED ORAL at 09:44

## 2024-07-16 RX ADMIN — KETOTIFEN FUMARATE 1 DROP: 0.25 SOLUTION/ DROPS OPHTHALMIC at 09:47

## 2024-07-16 RX ADMIN — KETOTIFEN FUMARATE 1 DROP: 0.25 SOLUTION/ DROPS OPHTHALMIC at 21:00

## 2024-07-16 RX ADMIN — LEVOTHYROXINE SODIUM 75 MCG: 0.07 TABLET ORAL at 09:44

## 2024-07-16 RX ADMIN — SULFAMETHOXAZOLE AND TRIMETHOPRIM 1 TABLET: 400; 80 TABLET ORAL at 09:44

## 2024-07-16 RX ADMIN — CARVEDILOL 12.5 MG: 6.25 TABLET, FILM COATED ORAL at 18:24

## 2024-07-16 RX ADMIN — PREDNISONE 60 MG: 20 TABLET ORAL at 09:44

## 2024-07-16 RX ADMIN — PANTOPRAZOLE SODIUM 40 MG: 40 TABLET, DELAYED RELEASE ORAL at 09:44

## 2024-07-16 RX ADMIN — ASPIRIN 81 MG CHEWABLE TABLET 81 MG: 81 TABLET CHEWABLE at 21:01

## 2024-07-16 RX ADMIN — ENOXAPARIN SODIUM 40 MG: 40 INJECTION SUBCUTANEOUS at 21:01

## 2024-07-16 ASSESSMENT — ACTIVITIES OF DAILY LIVING (ADL)
ADLS_ACUITY_SCORE: 25
ADLS_ACUITY_SCORE: 26
ADLS_ACUITY_SCORE: 25
ADLS_ACUITY_SCORE: 25
ADLS_ACUITY_SCORE: 26
ADLS_ACUITY_SCORE: 25
ADLS_ACUITY_SCORE: 26
ADLS_ACUITY_SCORE: 25
ADLS_ACUITY_SCORE: 26

## 2024-07-16 NOTE — PROGRESS NOTES
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  After evaluation felt most likely she has radiation related pneumonitis/hypoxia.     Acute hypoxemic respiratory failure  Probable radiation pneumonitis, felt less likely community-acquired pneumonia  Immunosuppressed with malignancy and recent chemoradiation:     Summary:  Medical history notable for left lower lobe adenocarcinoma with bulky mediastinal lymphadenopathy and 2 brain metastases that were ultimately diagnosed in April 2024.  Ms. Hudson smoked for about 40 pack years and quit in about 2005 when she had a myocardial infarction.  Her RCA was stented at that time and other less obstructive disease was noted.  She also has CKD stage III, essential hypertension and dyslipidemia.    -She notes that she received chemoradiation that ended in early June.    -Since completion, she has had progressive shortness of breath particularly with exertion.  She lives alone in a single-family home.  -  She has had difficulty with day-to-day activity.  He denies chest pain.  No clear fevers.  She has been coughing without much production.  In the ER, patient afebrile and normotensive.    -Saturations in the high 80s on room air. CBC without leukocytosis.  BMP with creatinine close to baseline at 1.37.  Sodium mildly low 128, potassium 3.2.  Her BNP was elevated at 2200.    -High-sensitivity troponin mildly elevated at 28 but stable on recheck.  She had a CT PE that showed no PE but diffuse, groundglass changes in both lungs with patchy airspace changes throughout the left lung with areas of consolidation.  No change  to known primary neoplasm.  Patient was given 40 mg of IV Lasix and started on ceftriaxone and azithromycin.    -Pulmonary service evaluation pursued and consulted.    -Our colleague discussed with Dr. Connolly on 7/10.    -Suspect radiation pneumonitis vs. Organizing pneumonia, more likely felt radiation.  Infectious workup thus far not remarkable.  Procal not elevated.  Viral panel negative.  1/2 blood culture positive for staph hominis which is likely contaminant.    -Checking fungal markers and sputum culture which are still in process.  Current recommended plan is discharge with prednisone 60 mg daily with slow outpatient taper TBD by follow-up providers.    - Patient to also d/c on daily bactrim for PJP ppx.  -  Completed azithromycin reasonable course and has been discontinued.    Will continue ceftriaxone/cephalosporin to complete 7 day course.   -She finished course of antibiotics earlier  -  Patient with variable O2 sats, but usually has been needing 4 L at rest.     -Still requiring up to 10 L of oxygen by nasal cannula during ambulation   -Highly appreciate continuous and extensive input from  with arrangements and referrals being made regarding oxygen support at home   -I believe patient will still be needing close monitoring and care here in the hospital with her ongoing hypoxia, easy desaturation even with minimal physical activity    -Still unclear if home oxygen support can meet this oxygen needs upon discharge  .  She will d/c on prednisone 60 mg daily with outpatient pulm follow-up.  I suspect O2 needs will slowly improve but this may take several weeks.  -Started on Bactrim for PJP prophylaxis while on high-dose steroids    Mild elevation of troponin, suspect Type II MI  Akinetic inferior and basal inferoseptal wall  Hypertension  hyperlipidemia:   Patient with mild elevation of her high-sensitivity troponin.  Stable on recheck.  Echocardiogram showed EF 50-55% with basal inferior,  "inferolateral and basal inferoseptal walls there were hypokinetic to akinetic.  It looks like this was noted on echocardiogram as well in 2022 in Care Everywhere.  -Continue on her home carvedilol, aspirin, statin.     Lung cancer, with primary in the left lower lobe, assoc bulky mediastinal ELPIDIO and two brain tumors at the time of diagnosis: Followed through [facility].  Pt has been treated with carboplatin (reduced dose) and paclitaxel along with radiation therapy starting on 5/8 and with the last XRT on 6/19/2024.  Also was treated with stereotactic radiation to left frontal and temporal lobe lesions in April.  -Patient should follow-up with her outpatient oncologist.     Leukocytosis: Suspect secondary to steroids. No sign of worsening infection.      Severe malnutrition in context of chronic illness: Nutrition consulted.      Hypokalemia: Replacement protocol    Hyponatremia: Mild. Monitor.     CKD3: Looks like baseline creatinine is 1.2-1.5.  Creatinine at baseline.  Monitor.              Diet: Combination Diet Regular Diet Adult  Snacks/Supplements Adult: Other; Ok for patient to order any oral supplements prn (takes at home); With Meals    DVT Prophylaxis: Enoxaparin (Lovenox) SQ and Ambulate every shift  Ling Catheter: Not present  Lines: PRESENT             Cardiac Monitoring: None  Code Status: No CPR- Pre-arrest intubation OK      Clinically Significant Risk Factors              # Hypoalbuminemia: Lowest albumin = 2.6 g/dL at 7/10/2024  8:37 AM, will monitor as appropriate        # Chronic heart failure with preserved ejection fraction: heart failure noted on problem list and last echo with EF >50%             # Overweight: Estimated body mass index is 26.26 kg/m  as calculated from the following:    Height as of this encounter: 1.626 m (5' 4\").    Weight as of this encounter: 69.4 kg (153 lb).   # Severe Malnutrition: based on nutrition assessment      # Financial/Environmental Concerns: none     "     Disposition Plan     Medically Ready for Discharge: Still needing inpatient care at least in the next 1 to 2 days pending improvement with her hypoxia and respiratory failure             Sanya Mccann MD, MD  Hospitalist Service  [facility]  Securely message with [employer] (more info)  Text page via [employer] Paging/Directory   ______________________________________________________________________    Interval History   Continuing medicine service care today.  Seen and examined.  Chart reviewed.  Case discussed with nursing service.    No significant reported issues overnight such as mental status changes.  Denies any ongoing nausea or vomiting.    Still requiring oxygen support and reportedly still needing increased amount of oxygen support more pronounced during ambulation even between 8 to 10 L.  She demonstrated abrupt oxygen desaturation during activity.  Fortunately remained asymptomatic with no lightheadedness, chest pain, or dizziness.    Tolerating oral diet.  Passing flatus.  Had a formed stool earlier.  No bleeding tendencies.    Remain afebrile.      Physical Exam   Vital Signs: Temp: 97.8  F (36.6  C) Temp src: Oral BP: 134/67 Pulse: 74   Resp: 20 SpO2: 90 % O2 Device: Nasal cannula Oxygen Delivery: 1 LPM  Weight: 153 lbs 0 oz    HEENT; Atraumatic, normocephalic, pinkish conjuctiva, pupils bilateral reactive   Skin: warm and moist, no rashes  Lungs: equal chest expansion, coarse breath sounds no wheezes, no stridor, no crackles,   Heart: normal rate, normal rhythm, no rubs or gallops.   Abdomen: normal bowel sounds, no tenderness, no peritoneal signs, no guarding  Extremities: no deformities, no edema   Neuro; follow commands, alert and oriented x3, spontaneous speech, coherent, moves all extremities spontaneously  Psych; no hallucination, euthymic mood, not agitated      Medical Decision Making       50 MINUTES SPENT BY ME on the date of service doing chart review, history,  exam, documentation & further activities per the note.  MANAGEMENT DISCUSSED with the following over the past 24 hours: Yes   NOTE(S)/MEDICAL RECORDS REVIEWED over the past 24 hours: Yes       Data     I have personally reviewed the following data over the past 24 hrs:    N/A  \   N/A   / 380     N/A N/A N/A /  N/A   N/A N/A 1.02 (H) \       Imaging results reviewed over the past 24 hrs:   No results found for this or any previous visit (from the past 24 hour(s)).

## 2024-07-16 NOTE — PLAN OF CARE
"Goal Outcome Evaluation:      Plan of Care Reviewed With: patient    Overall Patient Progress: improvingOverall Patient Progress: improving    Alert. Denies sob,pain, or chest pain. SBA assist with walker. Pt ambulated shorter distance around unit. O2 started with 3L and went up to 6L sats of 91% while ambulating around the unit. Able to wean pt down to 2L nasal cannula when in bed resting with sats of 94%. No IV access.     Problem: Adult Inpatient Plan of Care  Goal: Plan of Care Review  Description: The Plan of Care Review/Shift note should be completed every shift.  The Outcome Evaluation is a brief statement about your assessment that the patient is improving, declining, or no change.  This information will be displayed automatically on your shift  note.  Outcome: Adequate for Care Transition  Flowsheets (Taken 7/16/2024 0351)  Plan of Care Reviewed With: patient  Overall Patient Progress: improving  Goal: Patient-Specific Goal (Individualized)  Description: You can add care plan individualizations to a care plan. Examples of Individualization might be:  \"Parent requests to be called daily at 9am for status\", \"I have a hard time hearing out of my right ear\", or \"Do not touch me to wake me up as it startles  me\".  Outcome: Adequate for Care Transition  Goal: Absence of Hospital-Acquired Illness or Injury  Outcome: Adequate for Care Transition  Intervention: Identify and Manage Fall Risk  Recent Flowsheet Documentation  Taken 7/15/2024 2003 by Jeana Richey, RN  Safety Promotion/Fall Prevention:   safety round/check completed   clutter free environment maintained  Intervention: Prevent Skin Injury  Recent Flowsheet Documentation  Taken 7/15/2024 2003 by Jeana Richey, RN  Body Position: position changed independently  Intervention: Prevent and Manage VTE (Venous Thromboembolism) Risk  Recent Flowsheet Documentation  Taken 7/15/2024 2003 by Jeana Richey, RN  VTE Prevention/Management: SCDs off (sequential " compression devices)  Intervention: Prevent Infection  Recent Flowsheet Documentation  Taken 7/15/2024 2003 by Jeana Richey, RN  Infection Prevention:   hand hygiene promoted   single patient room provided  Goal: Optimal Comfort and Wellbeing  Outcome: Adequate for Care Transition  Goal: Readiness for Transition of Care  Outcome: Adequate for Care Transition     Problem: Gas Exchange Impaired  Goal: Optimal Gas Exchange  Outcome: Adequate for Care Transition     Problem: Fall Injury Risk  Goal: Absence of Fall and Fall-Related Injury  Outcome: Adequate for Care Transition  Intervention: Promote Injury-Free Environment  Recent Flowsheet Documentation  Taken 7/15/2024 2003 by Jeana Richey, RN  Safety Promotion/Fall Prevention:   safety round/check completed   clutter free environment maintained

## 2024-07-16 NOTE — PROGRESS NOTES
Care Management Follow Up    Length of Stay (days): 7    Expected Discharge Date: 07/17/2024     Concerns to be Addressed: discharge planning     Patient plan of care discussed at interdisciplinary rounds: Yes    Anticipated Discharge Disposition: Home, Home Care     Anticipated Discharge Services: Home Care  Anticipated Discharge DME: Oxygen    Patient/family educated on Medicare website which has current facility and service quality ratings: yes  Education Provided on the Discharge Plan: Yes  Patient/Family in Agreement with the Plan: yes    Referrals Placed by CM/SW: Homecare  Private pay costs discussed: Not applicable    Additional Information:  Sw met with the pt to update her that the charge nurse talked with the DME company regarding her O2 needs and they do have a portable tank that will go up to 10L.  Sw addressed her other questions and concerns.  Pt is hopeful to discharge tomorrow because she wants to be able to go to her Oncology appointment on Thursday.    Sw will continue with discharge planning and will be available as needed until discharge.    ARLENE Ashton, [facility]  Inpatient Care Coordination  [facility]  [phone]

## 2024-07-16 NOTE — PLAN OF CARE
Pt up SBA with walker. A&O. LS clear. On 4L NC @ rest, 8L oxymask with activity. CROWLEY. Desats with activity. Only able to amb short distances. Earnestine diet, denies nausea. Denies pain.  Goal Outcome Evaluation:      Plan of Care Reviewed With: patient    Overall Patient Progress: improvingOverall Patient Progress: improving    Outcome Evaluation: Bactrim, Prednisone, 4L rest, 8L oxymask, CROWLEY    Problem: Adult Inpatient Plan of Care  Goal: Plan of Care Review  Description: The Plan of Care Review/Shift note should be completed every shift.  The Outcome Evaluation is a brief statement about your assessment that the patient is improving, declining, or no change.  This information will be displayed automatically on your shift  note.  Recent Flowsheet Documentation  Taken 7/16/2024 1728 by Emilia Ledbetter RN  Outcome Evaluation: Bactrim, Prednisone, 4L rest, 8L oxymask, CROWLEY  Plan of Care Reviewed With: patient  Overall Patient Progress: improving  Goal: Absence of Hospital-Acquired Illness or Injury  Intervention: Identify and Manage Fall Risk  Recent Flowsheet Documentation  Taken 7/16/2024 0900 by Emilia Ledbetter RN  Safety Promotion/Fall Prevention: nonskid shoes/slippers when out of bed  Intervention: Prevent Skin Injury  Recent Flowsheet Documentation  Taken 7/16/2024 0900 by Emilia Ledbetter RN  Body Position: position changed independently  Intervention: Prevent and Manage VTE (Venous Thromboembolism) Risk  Recent Flowsheet Documentation  Taken 7/16/2024 0900 by Emilia Ledbetter RN  VTE Prevention/Management: SCDs off (sequential compression devices)

## 2024-07-17 ENCOUNTER — APPOINTMENT (OUTPATIENT)
Dept: OCCUPATIONAL THERAPY | Facility: CLINIC | Age: 79
DRG: 205 | End: 2024-07-17
Payer: COMMERCIAL

## 2024-07-17 ENCOUNTER — APPOINTMENT (OUTPATIENT)
Dept: PHYSICAL THERAPY | Facility: CLINIC | Age: 79
DRG: 205 | End: 2024-07-17
Payer: COMMERCIAL

## 2024-07-17 VITALS
BODY MASS INDEX: 26.12 KG/M2 | HEART RATE: 82 BPM | RESPIRATION RATE: 20 BRPM | SYSTOLIC BLOOD PRESSURE: 141 MMHG | DIASTOLIC BLOOD PRESSURE: 82 MMHG | OXYGEN SATURATION: 94 % | WEIGHT: 153 LBS | HEIGHT: 64 IN | TEMPERATURE: 98.3 F

## 2024-07-17 PROCEDURE — 250N000013 HC RX MED GY IP 250 OP 250 PS 637: Performed by: INTERNAL MEDICINE

## 2024-07-17 PROCEDURE — 97116 GAIT TRAINING THERAPY: CPT | Mod: GP

## 2024-07-17 PROCEDURE — 97530 THERAPEUTIC ACTIVITIES: CPT | Mod: GP

## 2024-07-17 PROCEDURE — 97535 SELF CARE MNGMENT TRAINING: CPT | Mod: GO | Performed by: REHABILITATION PRACTITIONER

## 2024-07-17 PROCEDURE — 250N000012 HC RX MED GY IP 250 OP 636 PS 637: Performed by: STUDENT IN AN ORGANIZED HEALTH CARE EDUCATION/TRAINING PROGRAM

## 2024-07-17 PROCEDURE — 99239 HOSP IP/OBS DSCHRG MGMT >30: CPT | Performed by: INTERNAL MEDICINE

## 2024-07-17 RX ORDER — SULFAMETHOXAZOLE AND TRIMETHOPRIM 400; 80 MG/1; MG/1
1 TABLET ORAL DAILY
Qty: 30 TABLET | Refills: 0 | Status: SHIPPED | OUTPATIENT
Start: 2024-07-17

## 2024-07-17 RX ORDER — PREDNISONE 20 MG/1
60 TABLET ORAL DAILY
Qty: 30 TABLET | Refills: 0 | Status: SHIPPED | OUTPATIENT
Start: 2024-07-17

## 2024-07-17 RX ADMIN — KETOTIFEN FUMARATE 1 DROP: 0.25 SOLUTION/ DROPS OPHTHALMIC at 12:45

## 2024-07-17 RX ADMIN — CARVEDILOL 12.5 MG: 6.25 TABLET, FILM COATED ORAL at 12:44

## 2024-07-17 RX ADMIN — PREDNISONE 60 MG: 20 TABLET ORAL at 12:44

## 2024-07-17 RX ADMIN — LEVOTHYROXINE SODIUM 75 MCG: 0.07 TABLET ORAL at 06:37

## 2024-07-17 RX ADMIN — PANTOPRAZOLE SODIUM 40 MG: 40 TABLET, DELAYED RELEASE ORAL at 12:44

## 2024-07-17 RX ADMIN — SULFAMETHOXAZOLE AND TRIMETHOPRIM 1 TABLET: 400; 80 TABLET ORAL at 12:44

## 2024-07-17 ASSESSMENT — ACTIVITIES OF DAILY LIVING (ADL)
ADLS_ACUITY_SCORE: 26
ADLS_ACUITY_SCORE: 25
ADLS_ACUITY_SCORE: 26
ADLS_ACUITY_SCORE: 25
ADLS_ACUITY_SCORE: 26
ADLS_ACUITY_SCORE: 25
ADLS_ACUITY_SCORE: 26
ADLS_ACUITY_SCORE: 26

## 2024-07-17 NOTE — PROGRESS NOTES
Care Management Discharge Note    Discharge Date: 07/17/2024       Discharge Disposition: Home, Home Care    Discharge Services: Home Care    Discharge DME: Oxygen    Discharge Transportation: family or friend will provide    Private pay costs discussed: Not applicable    Does the patient's insurance plan have a 3 day qualifying hospital stay waiver?  No    PAS Confirmation Code: N/A  Patient/family educated on Medicare website which has current facility and service quality ratings: yes    Education Provided on the Discharge Plan: Yes  Persons Notified of Discharge Plans: Pt, [facility]  Patient/Family in Agreement with the Plan: yes    Handoff Referral Completed: No    Additional Information:  The pt will discharge home today with [facility] RN/PT services.  Sw updated [facility] on the pt's discharge today and they will get the pt's discharge orders from [facility] P: [phone].    Pt had no additional questions or concerns for Sw at this time.    Sw will continue to be available as needed until discharge.      ARLENE Ashotn, [facility]  Inpatient Care Coordination  [facility]  [phone]

## 2024-07-17 NOTE — DISCHARGE INSTRUCTIONS
Your home care referral was sent to [facility] for Sk[facility] and Physical Therapy services.  If you haven't heard from them within the next 24-48 hours, please call them at [phone].   Primary Defect Width (In Cm): 0.6

## 2024-07-17 NOTE — PROGRESS NOTES
Patient has been assessed for Home Oxygen needs. Oxygen readings:    *Pulse oximetry (SpO2) = 89% on room air at rest while awake.    *SpO2 improved to 94% on 6liters/minute at rest.    *SpO2 = 80% on room air during activity/with exercise.    *SpO2 improved to 89% on 6liters/minute during activity/with exercise.

## 2024-07-17 NOTE — PROGRESS NOTES
Brief Pulmonary Note    Chart reviewed. Pt on 4L at rest. Increased to 8L w/ activity. On 60mg prednisone w/ PJP ppx for organizing pneumonia. Reviewed outpatient pulmonary notes, previously with normal spirometry and not on inhaler therapy. No changes to plan as outlined in Dr. Connolly's note. Can consider inhalers vs nebs. Pulmonary will sign off, please call back with questions.     Victor Manuel Schumacher MD  Pulmonary Disease and Critical Care Medicine  [facility]

## 2024-07-17 NOTE — DISCHARGE SUMMARY
"[facility]  Hospitalist Discharge Summary      Date of Admission:  7/9/2024  Date of Discharge:  7/17/2024  Discharging Provider: Sanya Mccann MD, MD  Discharge Service: Hospitalist Service    Discharge Diagnoses   Acute hypoxemic respiratory failure  Suspected radiation pneumonitis  Possible community-acquired pneumonia  Immunosuppression on chemotherapy with known history of metastatic non-small lung cancer    Type II AMI secondary to hypoxic respiratory failure  Hypertension  Dyslipidemia  Severe malnutrition in the context of chronic illness  Hypokalemia  Hyponatremia  CKD stage III    Clinically Significant Risk Factors     # Overweight: Estimated body mass index is 26.26 kg/m  as calculated from the following:    Height as of this encounter: 1.626 m (5' 4\").    Weight as of this encounter: 69.4 kg (153 lb).  # Severe Malnutrition: based on nutrition assessment      Follow-ups Needed After Discharge   Follow-up Appointments     Follow-up and recommended labs and tests       Follow up with primary care provider, Devin Viera, within 7 days to   evaluate medication change, to evaluate treatment change, and for hospital   follow- up.  The following labs/tests are recommended: Patient is on   high-dose steroids currently at 60 mg daily.  Plans for tapering upon   follow-up care with PCP and oncology service  Follow-up with your oncology service as scheduled.  Follow-up with pulmonary service as scheduled            Unresulted Labs Ordered in the Past 30 Days of this Admission       No orders found from 6/9/2024 to 7/10/2024.            Discharge Disposition   Discharged to home  Condition at discharge: Stable    Hospital Course     Continuing medicine service care today.  Seen and examined.  Chart reviewed.  No significant issues reported overnight.  She is getting more comfortable about home discharge disposition planning.  She still exhibiting hypoxia and will be requiring oxygen " support upon discharge.  Is to follow-up closely with PCP.  On high-dose steroids but tapering will be pursued as outpatient.    I will refer you to excerpts of prior progress notes as listed below for further details of hospitalization stay:      This is a case of a very pleasant 78-year-old lady who lives independently at home with background history non-small cell lung cancer with metastatic lesions in the brain with prior chemoradiation and stereotactic brain irradiation who presented with shortness of breath found with hypoxic respiratory failure secondary to underlying possible community-acquired pneumonia versus radiation pneumonitis.  She is requiring oxygen support will be continued upon discharge with home oxygen.  She will be continued on oral prednisone upon discharge with intention to further taper upon follow-up care.  PJP prophylaxis provided upon discharge.  Follow-up with oncology, pulmonary and PCP    78-year-old woman who began chemoradiation as well as stereotactic brain irradiation for non-small cell lung cancer metastatic to the brain in April 2024 and completed her cycle in early June 2024 who presented with progressive shortness of breath over the last several weeks.  After evaluation felt most likely she has radiation related pneumonitis/hypoxia.     Acute hypoxemic respiratory failure  Probable radiation pneumonitis, felt less likely community-acquired pneumonia  Immunosuppressed with malignancy and recent chemoradiation:     Summary:  Medical history notable for left lower lobe adenocarcinoma with bulky mediastinal lymphadenopathy and 2 brain metastases that were ultimately diagnosed in April 2024.  Ms. Hudson smoked for about 40 pack years and quit in about 2005 when she had a myocardial infarction.  Her RCA was stented at that time and other less obstructive disease was noted.  She also has CKD stage III, essential hypertension and dyslipidemia.    -She notes that she received  chemoradiation that ended in early June.    -Since completion, she has had progressive shortness of breath particularly with exertion.  She lives alone in a single-family home.  -  She has had difficulty with day-to-day activity.  He denies chest pain.  No clear fevers.  She has been coughing without much production.  In the ER, patient afebrile and normotensive.    -Saturations in the high 80s on room air. CBC without leukocytosis.  BMP with creatinine close to baseline at 1.37.  Sodium mildly low 128, potassium 3.2.  Her BNP was elevated at 2200.    -High-sensitivity troponin mildly elevated at 28 but stable on recheck.  She had a CT PE that showed no PE but diffuse, groundglass changes in both lungs with patchy airspace changes throughout the left lung with areas of consolidation.  No change to known primary neoplasm.  Patient was given 40 mg of IV Lasix and started on ceftriaxone and azithromycin.    -Pulmonary service evaluation pursued and consulted.    -Our colleague discussed with Dr. Connolly on 7/10.    -Suspect radiation pneumonitis vs. Organizing pneumonia, more likely felt radiation.  Infectious workup thus far not remarkable.  Procal not elevated.  Viral panel negative.  1/2 blood culture positive for staph hominis which is likely contaminant.    -Checking fungal markers and sputum culture which are still in process.  Current recommended plan is discharge with prednisone 60 mg daily with slow outpatient taper TBD by follow-up providers.    - Patient to also d/c on daily bactrim for PJP ppx.  -  Completed azithromycin reasonable course and has been discontinued.    Will continue ceftriaxone/cephalosporin to complete 7 day course.   -She finished course of antibiotics earlier  -  Patient with variable O2 sats, but usually has been needing 4 L at rest.     -Still requiring up to 10 L of oxygen by nasal cannula during ambulation   -Highly appreciate continuous and extensive input from  with  arrangements and referrals being made regarding oxygen support at home   -I believe patient will still be needing close monitoring and care here in the hospital with her ongoing hypoxia, easy desaturation even with minimal physical activity    -Still unclear if home oxygen support can meet this oxygen needs upon discharge  .  She will d/c on prednisone 60 mg daily with outpatient pulm follow-up.  I suspect O2 needs will slowly improve but this may take several weeks.  -Started on Bactrim for PJP prophylaxis while on high-dose steroids    Mild elevation of troponin, suspect Type II MI  Akinetic inferior and basal inferoseptal wall  Hypertension  hyperlipidemia:   Patient with mild elevation of her high-sensitivity troponin.  Stable on recheck.  Echocardiogram showed EF 50-55% with basal inferior, inferolateral and basal inferoseptal walls there were hypokinetic to akinetic.  It looks like this was noted on echocardiogram as well in 2022 in Care Everywhere.  -Continue on her home carvedilol, aspirin, statin.     Lung cancer, with primary in the left lower lobe, assoc bulky mediastinal ELPIDIO and two brain tumors at the time of diagnosis: [facility].  Pt has been treated with carboplatin (reduced dose) and paclitaxel along with radiation therapy starting on 5/8 and with the last XRT on 6/19/2024.  Also was treated with stereotactic radiation to left frontal and temporal lobe lesions in April.  -Patient should follow-up with her outpatient oncologist.     Leukocytosis: Suspect secondary to steroids. No sign of worsening infection.      Severe malnutrition in context of chronic illness: Nutrition consulted.      Hypokalemia: Replacement protocol    Hyponatremia: Mild. Monitor.     CKD3: Looks like baseline creatinine is 1.2-1.5.  Creatinine at baseline.  Monitor.        Consultations This Hospital Stay   PULMONARY IP CONSULT  PHYSICAL THERAPY ADULT IP CONSULT  OCCUPATIONAL THERAPY ADULT IP  CONSULT  PHYSICAL THERAPY ADULT IP CONSULT  CARE MANAGEMENT / SOCIAL WORK IP CONSULT  SPIRITUAL HEALTH SERVICES IP CONSULT    Code Status   No CPR- Pre-arrest intubation OK    Time Spent on this Encounter   I, Sanya Mccann MD, MD, personally saw the patient today and spent greater than 30 minutes discharging this patient.       Sanya Mccann MD, MD  [place] [address] [facility]  [address]  [facility] [address]  Phone: [phone]  Fax: [phone]  ______________________________________________________________________    Physical Exam   Vital Signs: Temp: 98.3  F (36.8  C) Temp src: Oral BP: (!) 141/82 Pulse: 82   Resp: 20 SpO2: 94 % (after resting 2 minutes) O2 Device: Nasal cannula Oxygen Delivery: 4 LPM  Weight: 153 lbs 0 oz  HEENT; Atraumatic, normocephalic, pinkish conjuctiva, pupils bilateral reactive   Skin: warm and moist, no rashes  Lungs: equal chest expansion, clear to auscultation, no wheezes, no stridor, no crackles,   Heart: normal rate, normal rhythm, no rubs or gallops.   Abdomen: normal bowel sounds, no tenderness, no peritoneal signs, no guarding  Extremities: no deformities, no edema   Neuro; follow commands, alert and oriented x3, spontaneous speech, coherent, moves all extremities spontaneously  Psych; no hallucination, euthymic mood, not agitated         Primary Care Physician   Devin Viera    Discharge Orders      Reason for your hospital stay    Is a case of a very pleasant 78-year-old lady who lives independently at home with background history non-small cell lung cancer with metastatic lesions in the brain with prior chemoradiation and stereotactic brain irradiation who presented with shortness of breath found with hypoxic respiratory failure secondary to underlying possible community-acquired pneumonia versus radiation pneumonitis.  She is requiring oxygen support will be continued upon discharge with home oxygen.  She will be continued on  oral prednisone upon discharge with intention to further taper upon follow-up care.  PJP prophylaxis provided upon discharge.  Follow-up with oncology, pulmonary and PCP     Follow-up and recommended labs and tests     Follow up with primary care provider, Devin Viera, within 7 days to evaluate medication change, to evaluate treatment change, and for hospital follow- up.  The following labs/tests are recommended: Patient is on high-dose steroids currently at 60 mg daily.  Plans for tapering upon follow-up care with PCP and oncology service  Follow-up with your oncology service as scheduled.  Follow-up with pulmonary service as scheduled     Activity    Your activity upon discharge: activity as tolerated     No CPR- Pre-arrest intubation OK     Oxygen Adult/Peds    Oxygen Documentation  I certify that this patient, Dasia Hudson has been under my care (or a nurse practitioner or physican's assistant working with me). This is the face-to-face encounter for oxygen medical necessity.      At the time of this encounter, I have reviewed the qualifying testing and have determined that supplemental oxygen is reasonable and necessary and is expected to improve the patient's condition in a home setting.         Patient has continued oxygen desaturation due to Pneumonia J18.9.    If portability is ordered, is the patient mobile within the home? yes    Was this visit performed as a telehealth visit: No     Diet    Follow this diet upon discharge: Orders Placed This Encounter      Snacks/Supplements Adult: Other; Ok for patient to order any oral supplements prn (takes at home); With Meals      Combination Diet Regular Diet Adult       Significant Results and Procedures   Most Recent 3 CBC's:  Recent Labs   Lab Test 07/16/24  0628 07/13/24  0644 07/12/24  0655 07/11/24  0801   WBC  --  14.6* 14.4* 11.4*   HGB  --  12.0 11.4* 12.6   MCV  --  89 89 88    412 376 406     Most Recent 3 BMP's:  Recent Labs   Lab Test  07/16/24  0628 07/13/24  0644 07/12/24  1400 07/11/24  0801 07/10/24  1737 07/10/24  0837   NA  --  133*  --  132*  --  131*   POTASSIUM  --  3.7 3.7 3.7   < > 2.9*   CHLORIDE  --  96*  --  94*  --  92*   CO2  --  25  --  23  --  25   BUN  --  19.9  --  17.9  --  19.2   CR 1.02* 1.10*  --  1.13*  --  1.39*   ANIONGAP  --  12  --  15  --  14   OMAR  --  8.7*  --  9.0  --  8.2*   GLC  --  102*  --  161*  --  99    < > = values in this interval not displayed.     Most Recent 2 LFT's:  Recent Labs   Lab Test 07/10/24  0837 07/09/24  1744   AST 83* 101*   ALT 45 52*   ALKPHOS 73 78   BILITOTAL 0.3 0.6     Most Recent 3 INR's:No lab results found.  Most Recent Cholesterol Panel:No lab results found.  7-Day Micro Results       Collected Updated Procedure Result Status      07/10/2024 1945 07/12/2024 1604 Histoplasma Galactomannan Antigen Quant by EIA, Urine [[id number]]   Urine, Clean Catch    Final result Component Value Units   Histoplasma Galactomannan Ag Quant, Urn Not Detected ng/mL   Histoplasma Galactomannan Ag Interp, Urn Not Detected    INTERPRETIVE DATA: Histoplasma Galactomannan Antigen                     Quantitative by EIA, Urine  Less than 0.4 ng/ml = Not Detected  0.4-0.7 ng/mL = Detected (below the limit of quantification)  0.8-24.0 ng/mL = Detected  Greater than 24.0 ng/mL = Detected (above the limit of   quantification)    The quantitative range of this assay is 0.8-24.0 ng/mL.   Antigen concentrations between 0.4-.07 or >24.0 ng/mL fall   outside the linear range of the assay and cannot be   accurately quantified.    This EIA test should be used in conjunction with other   diagnostic procedures, including microbiological culture,   histological examination of biopsy samples, and/or   radiographic evidence, to aid in the diagnosis of   histoplasmosis.    This test was developed and its performance characteristics   determined by [facility]. It has not been cleared or   approved by the U.S. Food  and Drug Administration. This   test was performed in a CLIA-certified laboratory and is   intended for clinical purposes.  Performed By: [facility]  [address] [place] [id number]  : Enrique Chavez MD, PhD  CLIA Number: [id number]            07/10/2024 1945 07/15/2024 0838 Blastomyces Agn Quant EIA Non Blood [[id number]]   Urine, Clean Catch    Final result Component Value   See Scanned Result BLASTOMYCES AGN QUANT EIA NON BLOOD-Scanned            07/10/2024 1920 07/15/2024 1931 Blood Culture Hand, Right [[id number]]   Blood from Hand, Right    Final result Component Value   Culture No Growth               07/10/2024 1920 07/15/2024 1931 Blood Culture Hand, Left [[id number]]   Blood from Hand, Left    Final result Component Value   Culture No Growth               07/10/2024 1737 07/12/2024 1345 Aspergillus Galactomannan Antigen [[id number]]   Blood from Hand, Right    Final result Component Value   Aspergillus Galactomannan Index 0.03   Aspergillus Galact AG Negative   INTERPRETIVE INFORMATION: Aspergillus Galactomannan Antigen   by EIA  Negative results do not exclude the diagnosis of invasive  aspergillosis. A single positive test result (index equal  to or greater than 0.5) should be clinically correlated  by testing a separate serum specimen because many agents  (e.g. foods, antibiotics) may cross-react with the test.  If invasive aspergillosis is suspected in high-risk  patients, serial sampling is recommended.  Performed By: [facility]  [address] [place] [id number]  : Enrique Chavez MD, PhD  CLIA Number: [id number]            07/10/2024 1737 07/12/2024 1538 1,3 Beta D glucan fungitell [[id number]]   Blood from Hand, Right    Final result Component Value Units   (1,3)-Beta-D-Glucan 32 pg/mL   B-D GLUCAN INTERPRETATION (1,3) Negative      The specimen submitted for (1,3)-Beta-D-Glucan (Fungitell)   testing contained  questionable amounts of interfering   substances which may alter the result. Re-collect if   clinically indicated.  INTERPRETIVE INFORMATION: (1,3)-beta-D-glucan (Fungitell)      Less than 31 pg/mL ................... Negative    31-59 pg/mL .......................... Negative    60-79 pg/mL .......................... Indeterminate    Greater than or equal to 80 pg/mL .... Positive    The Fungitell test is indicated for presumptive diagnosis   of fungal infection and should be used in conjunction with   other diagnostic procedures. This test does not detect   certain fungal species such as Cryptococcus, which produce   very low levels of (1,3)-beta-D-glucan. This test will not   detect the zygomycetes, such as Absidia, Mucor, and   Rhizopus, which are not known to produce   (1,3)-beta-D-glucan. In addition, the yeast phase of   Blastomyces dermatitidis produces little   (1,3)-beta-D-glucan and may not be detected by the assay.  Performed By: [facility]  [address] [place] [id number]  : Enrique Chavez MD, PhD  CLIA Number: [id number]                  Most Recent TSH and T4:No lab results found.  Most Recent Hemoglobin A1c:No lab results found.,   Results for orders placed or performed during the hospital encounter of 07/09/24   CT Chest Pulmonary Embolism w Contrast    Narrative    EXAM: CT CHEST PULMONARY EMBOLISM W CONTRAST  LOCATION: [facility]  DATE: 7/9/2024    INDICATION: Dyspnea on exertion. Elevated d-dimer.  COMPARISON: Contrast enhanced chest CT 4/29/2024  TECHNIQUE: CT chest pulmonary angiogram during arterial phase injection of IV contrast. Multiplanar reformats and MIP reconstructions were performed. Dose reduction techniques were used.   CONTRAST: 55mL Isovue 370    FINDINGS:  ANGIOGRAM CHEST: Pulmonary arteries are normal caliber and negative for pulmonary emboli. Mild calcified atherosclerotic changes of the thoracic aorta. No thoracic  aortic aneurysm.     LUNGS AND PLEURA: Stable, moderate centrilobular emphysema. New, diffuse groundglass changes throughout both lungs. New patchy airspace changes throughout the left lung with areas of consolidation. Approximately 1.9 x 1.0 cm peripheral nodule at the   posterior aspect of the left lower lobe with an indwelling fiducial marker. On the prior CT this measured 2.1 x 1.1 cm. No pleural effusions.    MEDIASTINUM/AXILLAE: Right internal jugular port with catheter tip at the low SVC. Stable left hilar adenopathy. A representative lymph node measures 1.6 cm. No new lymphadenopathy.    CORONARY ARTERY CALCIFICATION: Moderate.    UPPER ABDOMEN: Stable left hepatic lobe cyst. Stable nodular prominence of the left adrenal. Partially visualized abdominal aortic aneurysm.    MUSCULOSKELETAL: No suspicious osseous lesions.      Impression    IMPRESSION:  1.  No pulmonary emboli.  2.  New, diffuse groundglass changes throughout both lungs. New patchy airspace changes throughout the left lung with areas of consolidation. This is likely secondary to infectious or inflammatory pneumonitis.  3.  No significant change in the left lower lobe peripheral nodular density representing the known primary neoplasm. Indwelling fiducial marker.  4.  Stable left hilar adenopathy.  5.  Partially visualized abdominal aortic aneurysm.   Echocardiogram Complete     Value    LVEF  50-55%    Narrative    [id number]  [id number]  [id number]  [id number]^CIELO^ALTON^JENNIFER     [facility]  Echocardiography Laboratory  [address] [place] [address]     Name: BLAIR BRAND  MRN: [id number]  : 1945  Study Date: 07/10/2024 08:59 AM  Age: 78 yrs  Gender: Female  Patient Location: [facility]  Reason For Study: [place]  Ordering Physician: ALTON BUCK  Performed By: TYSHAWN Back     BSA: 1.7 m2  Height: 64 in  Weight: 153 lb  HR: 83  BP: 112/66  mmHg  ______________________________________________________________________________  Procedure  Complete Portable Echo Adult. Optison (NDC #5813-3138) given intravenously.  ______________________________________________________________________________  Interpretation Summary     The visual ejection fraction is 50-55%.  There are regional wall motion abnormalities as specified.  There is moderate (2+) mitral regurgitation.  There is mild (1+) aortic regurgitation.  The study was technically difficult.  ______________________________________________________________________________  Left Ventricle  The left ventricle is normal in size. There is normal left ventricular wall  thickness. Diastolic Doppler findings (E/E' ratio and/or other parameters)  suggest left ventricular filling pressures are increased. The visual ejection  fraction is 50-55%. The mid to basal inferior, inferolateral and basal  inferoseptal walls appears to be severely hypokinetic to akinetic. There are  regional wall motion abnormalities as specified.     Right Ventricle  The right ventricle is normal in size and function.     Atria  Normal left atrial size. Right atrial size is normal. There is no color  Doppler evidence of an atrial shunt.     Mitral Valve  There is moderate (2+) mitral regurgitation. The mitral regurgitant jet is  anteriorly directed, which is consistent with posterior leaflet pathology. The  mitral regurgitant jet is also directed along the interatrial septum.     Tricuspid Valve  There is trace tricuspid regurgitation. Right ventricular systolic pressure  could not be approximated due to inadequate tricuspid regurgitation.     Aortic Valve  The aortic valve is trileaflet. There is mild trileaflet aortic sclerosis.  There is mild (1+) aortic regurgitation. No hemodynamically significant  valvular aortic stenosis.     Pulmonic Valve  There is trace pulmonic valvular regurgitation. Normal pulmonic valve  velocity.      Vessels  IVC diameter <2.1 cm collapsing >50% with sniff suggests a normal RA pressure  of 3 mmHg.     Pericardium  There is no pericardial effusion.     Rhythm  Sinus rhythm was noted.  ______________________________________________________________________________  MMode/2D Measurements & Calculations     IVSd: 0.85 cm  LVIDd: 4.9 cm  LVIDs: 3.5 cm  LVPWd: 0.91 cm  FS: 29.5 %  LV mass(C)d: 149.5 grams  LV mass(C)dI: 85.6 grams/m2  Ao root diam: 2.8 cm  asc Aorta Diam: 3.2 cm  LVOT diam: 2.0 cm  LVOT area: 3.0 cm2  Ao root diam index Ht(cm/m): 1.7  Ao root diam index BSA (cm/m2): 1.6  Asc Ao diam index BSA (cm/m2): 1.8  Asc Ao diam index Ht(cm/m): 2.0  LA Volume (BP): 42.8 ml     LA Volume Index (BP): 24.5 ml/m2  RWT: 0.37  TAPSE: 2.4 cm     Doppler Measurements & Calculations  MV E max adarsh: 74.9 cm/sec  MV A max adarsh: 93.3 cm/sec  MV E/A: 0.80  MV max P.4 mmHg  MV mean P.3 mmHg  MV V2 VTI: 23.3 cm  MV dec time: 0.18 sec  AI P1/2t: 482.5 msec  MR PISA: 8.0 cm2  PA V2 max: 93.7 cm/sec  PA max PG: 3.5 mmHg  PA acc time: 0.11 sec  E/E' av.2  Lateral E/e': 9.2  Medial E/e': 17.2     RV S Adarsh: 12.2 cm/sec     ______________________________________________________________________________  Report approved by: Trae Carvajal 07/10/2024 10:54 AM             Discharge Medications   Current Discharge Medication List        START taking these medications    Details   predniSONE (DELTASONE) 20 MG tablet Take 3 tablets (60 mg) by mouth daily  Qty: 30 tablet, Refills: 0    Comments: Tapering will be done upon follow-up with PCP and oncology service  Associated Diagnoses: Community acquired bacterial pneumonia; Hypoxia      sulfamethoxazole-trimethoprim (BACTRIM) 400-80 MG tablet Take 1 tablet by mouth daily  Qty: 30 tablet, Refills: 0    Associated Diagnoses: Community acquired bacterial pneumonia; Hypoxia           CONTINUE these medications which have NOT CHANGED    Details   aspirin 81 MG EC tablet Take  81 mg by mouth every other day      carvedilol (COREG) 12.5 MG tablet Take 12.5 mg by mouth 2 times daily (with meals)      KETOTIFEN FUMARATE OP Place 1 drop into both eyes 2 times daily      levothyroxine (SYNTHROID/LEVOTHROID) 75 MCG tablet Take 75 mcg by mouth daily      omeprazole 20 MG tablet Take 20 mg by mouth daily      rosuvastatin (CRESTOR) 40 MG tablet Take 40 mg by mouth at bedtime           Allergies   No Known Allergies

## 2024-07-17 NOTE — PLAN OF CARE
Pertinent assessments: A&Ox4. Up SBA. VSS. 4LNC. Denies pain or nausea. SOB with exertion.   Major Shift Events: Uneventful    Treatment Plan: Attempt to wean O2 down, encourage IS use and activity.     Bedside Nurse: Andreina Strong RN                   Problem: Adult Inpatient Plan of Care  Goal: Absence of Hospital-Acquired Illness or Injury  Intervention: Identify and Manage Fall Risk  Recent Flowsheet Documentation  Taken 7/16/2024 2130 by Andreina Strong RN  Safety Promotion/Fall Prevention: nonskid shoes/slippers when out of bed  Intervention: Prevent Skin Injury  Recent Flowsheet Documentation  Taken 7/16/2024 2130 by Andreina Strong RN  Body Position: position changed independently  Intervention: Prevent and Manage VTE (Venous Thromboembolism) Risk  Recent Flowsheet Documentation  Taken 7/16/2024 2130 by Andreina Strong RN  VTE Prevention/Management: SCDs off (sequential compression devices)   Goal Outcome Evaluation:

## 2024-07-17 NOTE — PROGRESS NOTES
Pt d/c'd home. Discharge instructions given & verbalized understanding. Discharge meds sent with pt. Home oxygen sent with pt.

## 2024-07-18 NOTE — PLAN OF CARE
Physical Therapy Discharge Summary    Reason for therapy discharge:    Discharged to home with home therapy.    Progress towards therapy goal(s). See goals on Care Plan in [facility] electronic health record for goal details.  Goals not met.  Barriers to achieving goals:   discharge from facility.    Therapy recommendation(s):    Continued therapy is recommended.  Rationale/Recommendations:  Recommend HHPT to progress strength and IND mobility.

## 2024-07-18 NOTE — PLAN OF CARE
Occupational Therapy Discharge Summary    Reason for therapy discharge:    Discharged to home with home therapy.    Progress towards therapy goal(s). See goals on Care Plan in [facility] electronic health record for goal details.  Goals partially met.  Barriers to achieving goals:   discharge from facility.    Therapy recommendation(s):    Continued therapy is recommended.  Rationale/Recommendations:  pt presenting with deficits in activity tolerance, respiratory status deficits impacting ADL/IADL. pt requires 4 L NC at rest and 8 L NC with activity. Recommend home with A for IADL, shower chair use and  OT safety eval.      Pt not seen by writer on this date. Note written based on previous treating OT's note and recommendations.

## 2024-07-19 ENCOUNTER — PATIENT OUTREACH (OUTPATIENT)
Dept: CARE COORDINATION | Facility: CLINIC | Age: 79
End: 2024-07-19
Payer: COMMERCIAL

## 2024-07-19 NOTE — PROGRESS NOTES
Connected Care Resource Center:   [facility] Contact  [facility]/Voicemail     Clinical Data: Post-Discharge Outreach     Outreach attempted x 2.  Left message on patient's voicemail, providing [facility]'s central phone number of [phone] ([phone]) for questions/concerns and/or to schedule an appt with an [facility] provider, if they do not have a PCP.      Plan:  [facility] will do no further outreaches at this time.       MUSA Perez  Connected Care Resource Center, [facility]    *Connected Care Resource Team does NOT follow patient ongoing. Referrals are identified based on internal discharge reports and the outreach is to ensure patient has an understanding of their discharge instructions.

## 2024-07-26 ENCOUNTER — DOCUMENTATION ONLY (OUTPATIENT)
Dept: OTHER | Facility: CLINIC | Age: 79
End: 2024-07-26
Payer: COMMERCIAL

## 2025-04-17 ENCOUNTER — LAB REQUISITION (OUTPATIENT)
Dept: LAB | Facility: CLINIC | Age: 80
End: 2025-04-17
Payer: COMMERCIAL

## 2025-04-17 DIAGNOSIS — Z11.1 ENCOUNTER FOR SCREENING FOR RESPIRATORY TUBERCULOSIS: ICD-10-CM

## 2025-04-18 PROCEDURE — 86481 TB AG RESPONSE T-CELL SUSP: CPT | Mod: ORL | Performed by: INTERNAL MEDICINE

## 2025-04-18 PROCEDURE — P9603 ONE-WAY ALLOW PRORATED MILES: HCPCS | Mod: ORL | Performed by: INTERNAL MEDICINE

## 2025-04-18 PROCEDURE — 36415 COLL VENOUS BLD VENIPUNCTURE: CPT | Mod: ORL | Performed by: INTERNAL MEDICINE

## 2025-04-19 ENCOUNTER — LAB REQUISITION (OUTPATIENT)
Dept: LAB | Facility: CLINIC | Age: 80
End: 2025-04-19
Payer: COMMERCIAL

## 2025-04-19 DIAGNOSIS — Z11.1 ENCOUNTER FOR SCREENING FOR RESPIRATORY TUBERCULOSIS: ICD-10-CM

## 2025-04-19 LAB
GAMMA INTERFERON BACKGROUND BLD IA-ACNC: 0.04 IU/ML
M TB IFN-G BLD-IMP: NEGATIVE
M TB IFN-G CD4+ BCKGRND COR BLD-ACNC: 9.96 IU/ML
MITOGEN IGNF BCKGRD COR BLD-ACNC: -0.01 IU/ML
MITOGEN IGNF BCKGRD COR BLD-ACNC: 0 IU/ML
QUANTIFERON MITOGEN: 10 IU/ML
QUANTIFERON NIL TUBE: 0.04 IU/ML
QUANTIFERON TB1 TUBE: 0.03 IU/ML
QUANTIFERON TB2 TUBE: 0.04

## 2025-06-14 ENCOUNTER — HEALTH MAINTENANCE LETTER (OUTPATIENT)
Age: 80
End: 2025-06-14